# Patient Record
Sex: MALE | Race: WHITE | NOT HISPANIC OR LATINO | Employment: FULL TIME | URBAN - METROPOLITAN AREA
[De-identification: names, ages, dates, MRNs, and addresses within clinical notes are randomized per-mention and may not be internally consistent; named-entity substitution may affect disease eponyms.]

---

## 2021-01-14 ENCOUNTER — LAB VISIT (OUTPATIENT)
Dept: LAB | Facility: HOSPITAL | Age: 46
End: 2021-01-14
Attending: INTERNAL MEDICINE
Payer: COMMERCIAL

## 2021-01-14 ENCOUNTER — OFFICE VISIT (OUTPATIENT)
Dept: PRIMARY CARE CLINIC | Facility: CLINIC | Age: 46
End: 2021-01-14
Payer: COMMERCIAL

## 2021-01-14 ENCOUNTER — TELEPHONE (OUTPATIENT)
Dept: ADMINISTRATIVE | Facility: HOSPITAL | Age: 46
End: 2021-01-14

## 2021-01-14 VITALS
HEART RATE: 76 BPM | DIASTOLIC BLOOD PRESSURE: 67 MMHG | HEIGHT: 68 IN | TEMPERATURE: 98 F | WEIGHT: 179.44 LBS | SYSTOLIC BLOOD PRESSURE: 115 MMHG | RESPIRATION RATE: 18 BRPM | BODY MASS INDEX: 27.19 KG/M2 | OXYGEN SATURATION: 99 %

## 2021-01-14 DIAGNOSIS — Z00.00 NORMAL PHYSICAL EXAM, ROUTINE: ICD-10-CM

## 2021-01-14 DIAGNOSIS — Z00.00 NORMAL PHYSICAL EXAM, ROUTINE: Primary | ICD-10-CM

## 2021-01-14 DIAGNOSIS — L40.9 PSORIASIS: ICD-10-CM

## 2021-01-14 DIAGNOSIS — Z13.220 SCREENING FOR LIPOID DISORDERS: ICD-10-CM

## 2021-01-14 LAB
BASOPHILS # BLD AUTO: 0.03 K/UL (ref 0–0.2)
BASOPHILS NFR BLD: 0.7 % (ref 0–1.9)
DIFFERENTIAL METHOD: ABNORMAL
EOSINOPHIL # BLD AUTO: 0.2 K/UL (ref 0–0.5)
EOSINOPHIL NFR BLD: 3.5 % (ref 0–8)
ERYTHROCYTE [DISTWIDTH] IN BLOOD BY AUTOMATED COUNT: 11.9 % (ref 11.5–14.5)
HCT VFR BLD AUTO: 46.5 % (ref 40–54)
HGB BLD-MCNC: 15.3 G/DL (ref 14–18)
IMM GRANULOCYTES # BLD AUTO: 0.01 K/UL (ref 0–0.04)
IMM GRANULOCYTES NFR BLD AUTO: 0.2 % (ref 0–0.5)
LYMPHOCYTES # BLD AUTO: 1.5 K/UL (ref 1–4.8)
LYMPHOCYTES NFR BLD: 31.9 % (ref 18–48)
MCH RBC QN AUTO: 29.6 PG (ref 27–31)
MCHC RBC AUTO-ENTMCNC: 32.9 G/DL (ref 32–36)
MCV RBC AUTO: 90 FL (ref 82–98)
MONOCYTES # BLD AUTO: 0.7 K/UL (ref 0.3–1)
MONOCYTES NFR BLD: 15.8 % (ref 4–15)
NEUTROPHILS # BLD AUTO: 2.2 K/UL (ref 1.8–7.7)
NEUTROPHILS NFR BLD: 47.9 % (ref 38–73)
NRBC BLD-RTO: 0 /100 WBC
PLATELET # BLD AUTO: 153 K/UL (ref 150–350)
PMV BLD AUTO: 11.5 FL (ref 9.2–12.9)
RBC # BLD AUTO: 5.17 M/UL (ref 4.6–6.2)
WBC # BLD AUTO: 4.61 K/UL (ref 3.9–12.7)

## 2021-01-14 PROCEDURE — 99999 PR PBB SHADOW E&M-NEW PATIENT-LVL III: CPT | Mod: PBBFAC,,, | Performed by: INTERNAL MEDICINE

## 2021-01-14 PROCEDURE — 80061 LIPID PANEL: CPT

## 2021-01-14 PROCEDURE — 1126F AMNT PAIN NOTED NONE PRSNT: CPT | Mod: S$GLB,,, | Performed by: INTERNAL MEDICINE

## 2021-01-14 PROCEDURE — 85025 COMPLETE CBC W/AUTO DIFF WBC: CPT

## 2021-01-14 PROCEDURE — 99386 PR PREVENTIVE VISIT,NEW,40-64: ICD-10-PCS | Mod: S$GLB,,, | Performed by: INTERNAL MEDICINE

## 2021-01-14 PROCEDURE — 1126F PR PAIN SEVERITY QUANTIFIED, NO PAIN PRESENT: ICD-10-PCS | Mod: S$GLB,,, | Performed by: INTERNAL MEDICINE

## 2021-01-14 PROCEDURE — 3008F BODY MASS INDEX DOCD: CPT | Mod: CPTII,S$GLB,, | Performed by: INTERNAL MEDICINE

## 2021-01-14 PROCEDURE — 80053 COMPREHEN METABOLIC PANEL: CPT

## 2021-01-14 PROCEDURE — 84439 ASSAY OF FREE THYROXINE: CPT

## 2021-01-14 PROCEDURE — 99999 PR PBB SHADOW E&M-NEW PATIENT-LVL III: ICD-10-PCS | Mod: PBBFAC,,, | Performed by: INTERNAL MEDICINE

## 2021-01-14 PROCEDURE — 36415 COLL VENOUS BLD VENIPUNCTURE: CPT | Mod: PN

## 2021-01-14 PROCEDURE — 84443 ASSAY THYROID STIM HORMONE: CPT

## 2021-01-14 PROCEDURE — 99386 PREV VISIT NEW AGE 40-64: CPT | Mod: S$GLB,,, | Performed by: INTERNAL MEDICINE

## 2021-01-14 PROCEDURE — 3008F PR BODY MASS INDEX (BMI) DOCUMENTED: ICD-10-PCS | Mod: CPTII,S$GLB,, | Performed by: INTERNAL MEDICINE

## 2021-01-15 LAB
ALBUMIN SERPL BCP-MCNC: 4.5 G/DL (ref 3.5–5.2)
ALP SERPL-CCNC: 79 U/L (ref 55–135)
ALT SERPL W/O P-5'-P-CCNC: 22 U/L (ref 10–44)
ANION GAP SERPL CALC-SCNC: 14 MMOL/L (ref 8–16)
AST SERPL-CCNC: 21 U/L (ref 10–40)
BILIRUB SERPL-MCNC: 1.2 MG/DL (ref 0.1–1)
BUN SERPL-MCNC: 17 MG/DL (ref 6–20)
CALCIUM SERPL-MCNC: 9.2 MG/DL (ref 8.7–10.5)
CHLORIDE SERPL-SCNC: 103 MMOL/L (ref 95–110)
CHOLEST SERPL-MCNC: 164 MG/DL (ref 120–199)
CHOLEST/HDLC SERPL: 4.6 {RATIO} (ref 2–5)
CO2 SERPL-SCNC: 26 MMOL/L (ref 23–29)
CREAT SERPL-MCNC: 0.8 MG/DL (ref 0.5–1.4)
EST. GFR  (AFRICAN AMERICAN): >60 ML/MIN/1.73 M^2
EST. GFR  (NON AFRICAN AMERICAN): >60 ML/MIN/1.73 M^2
GLUCOSE SERPL-MCNC: 76 MG/DL (ref 70–110)
HDLC SERPL-MCNC: 36 MG/DL (ref 40–75)
HDLC SERPL: 22 % (ref 20–50)
LDLC SERPL CALC-MCNC: 106.2 MG/DL (ref 63–159)
NONHDLC SERPL-MCNC: 128 MG/DL
POTASSIUM SERPL-SCNC: 4 MMOL/L (ref 3.5–5.1)
PROT SERPL-MCNC: 6.9 G/DL (ref 6–8.4)
SODIUM SERPL-SCNC: 143 MMOL/L (ref 136–145)
T4 FREE SERPL-MCNC: 1.06 NG/DL (ref 0.71–1.51)
TRIGL SERPL-MCNC: 109 MG/DL (ref 30–150)
TSH SERPL DL<=0.005 MIU/L-ACNC: 0.56 UIU/ML (ref 0.4–4)

## 2021-01-18 DIAGNOSIS — E80.6 HYPERBILIRUBINEMIA: Primary | ICD-10-CM

## 2021-01-19 ENCOUNTER — TELEPHONE (OUTPATIENT)
Dept: PRIMARY CARE CLINIC | Facility: CLINIC | Age: 46
End: 2021-01-19

## 2021-03-04 ENCOUNTER — LAB VISIT (OUTPATIENT)
Dept: LAB | Facility: HOSPITAL | Age: 46
End: 2021-03-04
Attending: INTERNAL MEDICINE
Payer: COMMERCIAL

## 2021-03-04 DIAGNOSIS — E80.6 HYPERBILIRUBINEMIA: ICD-10-CM

## 2021-03-04 LAB
ALBUMIN SERPL BCP-MCNC: 4 G/DL (ref 3.5–5.2)
ALP SERPL-CCNC: 83 U/L (ref 55–135)
ALT SERPL W/O P-5'-P-CCNC: 22 U/L (ref 10–44)
AST SERPL-CCNC: 21 U/L (ref 10–40)
BILIRUB DIRECT SERPL-MCNC: 0.2 MG/DL (ref 0.1–0.3)
BILIRUB SERPL-MCNC: 0.5 MG/DL (ref 0.1–1)
PROT SERPL-MCNC: 6.6 G/DL (ref 6–8.4)

## 2021-03-04 PROCEDURE — 36415 COLL VENOUS BLD VENIPUNCTURE: CPT | Mod: PN | Performed by: INTERNAL MEDICINE

## 2021-03-04 PROCEDURE — 80076 HEPATIC FUNCTION PANEL: CPT | Performed by: INTERNAL MEDICINE

## 2021-04-26 ENCOUNTER — PATIENT MESSAGE (OUTPATIENT)
Dept: RESEARCH | Facility: HOSPITAL | Age: 46
End: 2021-04-26

## 2021-07-29 ENCOUNTER — OFFICE VISIT (OUTPATIENT)
Dept: URGENT CARE | Facility: CLINIC | Age: 46
End: 2021-07-29
Payer: COMMERCIAL

## 2021-07-29 VITALS
TEMPERATURE: 99 F | HEIGHT: 68 IN | HEART RATE: 85 BPM | WEIGHT: 179 LBS | OXYGEN SATURATION: 99 % | DIASTOLIC BLOOD PRESSURE: 81 MMHG | RESPIRATION RATE: 18 BRPM | SYSTOLIC BLOOD PRESSURE: 126 MMHG | BODY MASS INDEX: 27.13 KG/M2

## 2021-07-29 DIAGNOSIS — R05.9 COUGH: Primary | ICD-10-CM

## 2021-07-29 DIAGNOSIS — J40 BRONCHITIS: ICD-10-CM

## 2021-07-29 LAB
CTP QC/QA: YES
SARS-COV-2 RDRP RESP QL NAA+PROBE: NEGATIVE

## 2021-07-29 PROCEDURE — 3079F PR MOST RECENT DIASTOLIC BLOOD PRESSURE 80-89 MM HG: ICD-10-PCS | Mod: CPTII,S$GLB,, | Performed by: NURSE PRACTITIONER

## 2021-07-29 PROCEDURE — 99214 PR OFFICE/OUTPT VISIT, EST, LEVL IV, 30-39 MIN: ICD-10-PCS | Mod: S$GLB,,, | Performed by: NURSE PRACTITIONER

## 2021-07-29 PROCEDURE — U0002 COVID-19 LAB TEST NON-CDC: HCPCS | Mod: QW,S$GLB,, | Performed by: NURSE PRACTITIONER

## 2021-07-29 PROCEDURE — 1160F RVW MEDS BY RX/DR IN RCRD: CPT | Mod: CPTII,S$GLB,, | Performed by: NURSE PRACTITIONER

## 2021-07-29 PROCEDURE — 1159F MED LIST DOCD IN RCRD: CPT | Mod: CPTII,S$GLB,, | Performed by: NURSE PRACTITIONER

## 2021-07-29 PROCEDURE — 1160F PR REVIEW ALL MEDS BY PRESCRIBER/CLIN PHARMACIST DOCUMENTED: ICD-10-PCS | Mod: CPTII,S$GLB,, | Performed by: NURSE PRACTITIONER

## 2021-07-29 PROCEDURE — 3079F DIAST BP 80-89 MM HG: CPT | Mod: CPTII,S$GLB,, | Performed by: NURSE PRACTITIONER

## 2021-07-29 PROCEDURE — 3074F SYST BP LT 130 MM HG: CPT | Mod: CPTII,S$GLB,, | Performed by: NURSE PRACTITIONER

## 2021-07-29 PROCEDURE — U0002: ICD-10-PCS | Mod: QW,S$GLB,, | Performed by: NURSE PRACTITIONER

## 2021-07-29 PROCEDURE — 3008F PR BODY MASS INDEX (BMI) DOCUMENTED: ICD-10-PCS | Mod: CPTII,S$GLB,, | Performed by: NURSE PRACTITIONER

## 2021-07-29 PROCEDURE — 3008F BODY MASS INDEX DOCD: CPT | Mod: CPTII,S$GLB,, | Performed by: NURSE PRACTITIONER

## 2021-07-29 PROCEDURE — 1159F PR MEDICATION LIST DOCUMENTED IN MEDICAL RECORD: ICD-10-PCS | Mod: CPTII,S$GLB,, | Performed by: NURSE PRACTITIONER

## 2021-07-29 PROCEDURE — 99214 OFFICE O/P EST MOD 30 MIN: CPT | Mod: S$GLB,,, | Performed by: NURSE PRACTITIONER

## 2021-07-29 PROCEDURE — 3074F PR MOST RECENT SYSTOLIC BLOOD PRESSURE < 130 MM HG: ICD-10-PCS | Mod: CPTII,S$GLB,, | Performed by: NURSE PRACTITIONER

## 2021-07-29 RX ORDER — ALBUTEROL SULFATE 90 UG/1
2 AEROSOL, METERED RESPIRATORY (INHALATION) EVERY 6 HOURS PRN
Qty: 18 G | Refills: 0 | Status: SHIPPED | OUTPATIENT
Start: 2021-07-29 | End: 2023-05-15 | Stop reason: SDUPTHER

## 2021-07-29 RX ORDER — BENZONATATE 200 MG/1
200 CAPSULE ORAL 3 TIMES DAILY PRN
Qty: 30 CAPSULE | Refills: 0 | Status: SHIPPED | OUTPATIENT
Start: 2021-07-29 | End: 2021-08-08

## 2021-08-02 ENCOUNTER — TELEPHONE (OUTPATIENT)
Dept: UROLOGY | Facility: CLINIC | Age: 46
End: 2021-08-02

## 2021-08-04 ENCOUNTER — OFFICE VISIT (OUTPATIENT)
Dept: UROLOGY | Facility: CLINIC | Age: 46
End: 2021-08-04
Payer: COMMERCIAL

## 2021-08-04 VITALS
WEIGHT: 176.38 LBS | DIASTOLIC BLOOD PRESSURE: 82 MMHG | HEART RATE: 64 BPM | BODY MASS INDEX: 26.73 KG/M2 | SYSTOLIC BLOOD PRESSURE: 129 MMHG | HEIGHT: 68 IN

## 2021-08-04 DIAGNOSIS — N43.40 SPERMATOCELE: Primary | ICD-10-CM

## 2021-08-04 PROCEDURE — 3008F PR BODY MASS INDEX (BMI) DOCUMENTED: ICD-10-PCS | Mod: CPTII,S$GLB,, | Performed by: UROLOGY

## 2021-08-04 PROCEDURE — 3074F SYST BP LT 130 MM HG: CPT | Mod: CPTII,S$GLB,, | Performed by: UROLOGY

## 2021-08-04 PROCEDURE — 3074F PR MOST RECENT SYSTOLIC BLOOD PRESSURE < 130 MM HG: ICD-10-PCS | Mod: CPTII,S$GLB,, | Performed by: UROLOGY

## 2021-08-04 PROCEDURE — 99203 PR OFFICE/OUTPT VISIT, NEW, LEVL III, 30-44 MIN: ICD-10-PCS | Mod: S$GLB,,, | Performed by: UROLOGY

## 2021-08-04 PROCEDURE — 1159F PR MEDICATION LIST DOCUMENTED IN MEDICAL RECORD: ICD-10-PCS | Mod: CPTII,S$GLB,, | Performed by: UROLOGY

## 2021-08-04 PROCEDURE — 99203 OFFICE O/P NEW LOW 30 MIN: CPT | Mod: S$GLB,,, | Performed by: UROLOGY

## 2021-08-04 PROCEDURE — 3079F DIAST BP 80-89 MM HG: CPT | Mod: CPTII,S$GLB,, | Performed by: UROLOGY

## 2021-08-04 PROCEDURE — 1126F AMNT PAIN NOTED NONE PRSNT: CPT | Mod: CPTII,S$GLB,, | Performed by: UROLOGY

## 2021-08-04 PROCEDURE — 1160F PR REVIEW ALL MEDS BY PRESCRIBER/CLIN PHARMACIST DOCUMENTED: ICD-10-PCS | Mod: CPTII,S$GLB,, | Performed by: UROLOGY

## 2021-08-04 PROCEDURE — 3008F BODY MASS INDEX DOCD: CPT | Mod: CPTII,S$GLB,, | Performed by: UROLOGY

## 2021-08-04 PROCEDURE — 3079F PR MOST RECENT DIASTOLIC BLOOD PRESSURE 80-89 MM HG: ICD-10-PCS | Mod: CPTII,S$GLB,, | Performed by: UROLOGY

## 2021-08-04 PROCEDURE — 1160F RVW MEDS BY RX/DR IN RCRD: CPT | Mod: CPTII,S$GLB,, | Performed by: UROLOGY

## 2021-08-04 PROCEDURE — 99999 PR PBB SHADOW E&M-EST. PATIENT-LVL III: ICD-10-PCS | Mod: PBBFAC,,, | Performed by: UROLOGY

## 2021-08-04 PROCEDURE — 99999 PR PBB SHADOW E&M-EST. PATIENT-LVL III: CPT | Mod: PBBFAC,,, | Performed by: UROLOGY

## 2021-08-04 PROCEDURE — 1159F MED LIST DOCD IN RCRD: CPT | Mod: CPTII,S$GLB,, | Performed by: UROLOGY

## 2021-08-04 PROCEDURE — 1126F PR PAIN SEVERITY QUANTIFIED, NO PAIN PRESENT: ICD-10-PCS | Mod: CPTII,S$GLB,, | Performed by: UROLOGY

## 2021-09-13 ENCOUNTER — TELEPHONE (OUTPATIENT)
Dept: PRIMARY CARE CLINIC | Facility: CLINIC | Age: 46
End: 2021-09-13

## 2021-09-13 DIAGNOSIS — U07.1 COVID-19: Primary | ICD-10-CM

## 2021-09-14 ENCOUNTER — PATIENT MESSAGE (OUTPATIENT)
Dept: ADMINISTRATIVE | Facility: OTHER | Age: 46
End: 2021-09-14

## 2021-09-14 ENCOUNTER — INFUSION (OUTPATIENT)
Dept: INFECTIOUS DISEASES | Facility: HOSPITAL | Age: 46
End: 2021-09-14
Attending: INTERNAL MEDICINE
Payer: COMMERCIAL

## 2021-09-14 VITALS
DIASTOLIC BLOOD PRESSURE: 85 MMHG | TEMPERATURE: 99 F | SYSTOLIC BLOOD PRESSURE: 122 MMHG | HEART RATE: 61 BPM | RESPIRATION RATE: 14 BRPM | BODY MASS INDEX: 26.37 KG/M2 | HEIGHT: 68 IN | WEIGHT: 174 LBS | OXYGEN SATURATION: 100 %

## 2021-09-14 DIAGNOSIS — U07.1 COVID-19: ICD-10-CM

## 2021-09-14 PROCEDURE — 63600175 PHARM REV CODE 636 W HCPCS: Performed by: INTERNAL MEDICINE

## 2021-09-14 PROCEDURE — M0243 CASIRIVI AND IMDEVI INFUSION: HCPCS | Performed by: INTERNAL MEDICINE

## 2021-09-14 PROCEDURE — 25000003 PHARM REV CODE 250: Performed by: INTERNAL MEDICINE

## 2021-09-14 RX ORDER — SODIUM CHLORIDE 0.9 % (FLUSH) 0.9 %
10 SYRINGE (ML) INJECTION
Status: DISCONTINUED | OUTPATIENT
Start: 2021-09-14 | End: 2023-05-15

## 2021-09-14 RX ORDER — DIPHENHYDRAMINE HYDROCHLORIDE 50 MG/ML
25 INJECTION INTRAMUSCULAR; INTRAVENOUS ONCE AS NEEDED
Status: DISCONTINUED | OUTPATIENT
Start: 2021-09-14 | End: 2023-05-15

## 2021-09-14 RX ORDER — ACETAMINOPHEN 325 MG/1
650 TABLET ORAL ONCE AS NEEDED
Status: DISCONTINUED | OUTPATIENT
Start: 2021-09-14 | End: 2023-05-15

## 2021-09-14 RX ORDER — ONDANSETRON 4 MG/1
4 TABLET, ORALLY DISINTEGRATING ORAL ONCE AS NEEDED
Status: DISCONTINUED | OUTPATIENT
Start: 2021-09-14 | End: 2023-05-15

## 2021-09-14 RX ORDER — ALBUTEROL SULFATE 90 UG/1
2 AEROSOL, METERED RESPIRATORY (INHALATION)
Status: DISCONTINUED | OUTPATIENT
Start: 2021-09-14 | End: 2023-05-15

## 2021-09-14 RX ORDER — EPINEPHRINE 0.3 MG/.3ML
0.3 INJECTION SUBCUTANEOUS
Status: DISCONTINUED | OUTPATIENT
Start: 2021-09-14 | End: 2023-09-08

## 2021-09-14 RX ADMIN — CASIRIVIMAB AND IMDEVIMAB 600 MG: 600; 600 INJECTION, SOLUTION, CONCENTRATE INTRAVENOUS at 01:09

## 2021-09-15 ENCOUNTER — PATIENT MESSAGE (OUTPATIENT)
Dept: ADMINISTRATIVE | Facility: OTHER | Age: 46
End: 2021-09-15

## 2021-09-16 ENCOUNTER — PATIENT MESSAGE (OUTPATIENT)
Dept: ADMINISTRATIVE | Facility: OTHER | Age: 46
End: 2021-09-16

## 2021-09-16 ENCOUNTER — NURSE TRIAGE (OUTPATIENT)
Dept: ADMINISTRATIVE | Facility: CLINIC | Age: 46
End: 2021-09-16

## 2021-09-17 ENCOUNTER — PATIENT MESSAGE (OUTPATIENT)
Dept: ADMINISTRATIVE | Facility: OTHER | Age: 46
End: 2021-09-17

## 2021-09-18 ENCOUNTER — PATIENT MESSAGE (OUTPATIENT)
Dept: ADMINISTRATIVE | Facility: OTHER | Age: 46
End: 2021-09-18

## 2021-09-19 ENCOUNTER — PATIENT MESSAGE (OUTPATIENT)
Dept: ADMINISTRATIVE | Facility: OTHER | Age: 46
End: 2021-09-19

## 2021-09-20 ENCOUNTER — PATIENT MESSAGE (OUTPATIENT)
Dept: ADMINISTRATIVE | Facility: OTHER | Age: 46
End: 2021-09-20

## 2021-09-21 ENCOUNTER — PATIENT MESSAGE (OUTPATIENT)
Dept: ADMINISTRATIVE | Facility: OTHER | Age: 46
End: 2021-09-21

## 2021-09-22 ENCOUNTER — PATIENT MESSAGE (OUTPATIENT)
Dept: ADMINISTRATIVE | Facility: OTHER | Age: 46
End: 2021-09-22

## 2021-09-23 ENCOUNTER — PATIENT MESSAGE (OUTPATIENT)
Dept: ADMINISTRATIVE | Facility: OTHER | Age: 46
End: 2021-09-23

## 2021-09-25 ENCOUNTER — PATIENT MESSAGE (OUTPATIENT)
Dept: ADMINISTRATIVE | Facility: OTHER | Age: 46
End: 2021-09-25

## 2021-09-26 ENCOUNTER — PATIENT MESSAGE (OUTPATIENT)
Dept: ADMINISTRATIVE | Facility: OTHER | Age: 46
End: 2021-09-26

## 2021-12-13 ENCOUNTER — PATIENT MESSAGE (OUTPATIENT)
Dept: PRIMARY CARE CLINIC | Facility: CLINIC | Age: 46
End: 2021-12-13
Payer: COMMERCIAL

## 2022-01-18 ENCOUNTER — PATIENT MESSAGE (OUTPATIENT)
Dept: ADMINISTRATIVE | Facility: HOSPITAL | Age: 47
End: 2022-01-18
Payer: COMMERCIAL

## 2022-02-07 ENCOUNTER — PATIENT MESSAGE (OUTPATIENT)
Dept: PRIMARY CARE CLINIC | Facility: CLINIC | Age: 47
End: 2022-02-07
Payer: COMMERCIAL
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
## 2022-08-24 ENCOUNTER — HOSPITAL ENCOUNTER (OUTPATIENT)
Dept: RADIOLOGY | Facility: HOSPITAL | Age: 47
Discharge: HOME | End: 2022-08-24
Attending: INTERNAL MEDICINE
Payer: COMMERCIAL

## 2022-08-24 ENCOUNTER — OFFICE VISIT (OUTPATIENT)
Dept: PRIMARY CARE CLINIC | Facility: CLINIC | Age: 47
End: 2022-08-24
Payer: COMMERCIAL

## 2022-08-24 VITALS
SYSTOLIC BLOOD PRESSURE: 114 MMHG | HEIGHT: 68 IN | OXYGEN SATURATION: 97 % | TEMPERATURE: 98 F | RESPIRATION RATE: 16 BRPM | HEART RATE: 59 BPM | BODY MASS INDEX: 27.23 KG/M2 | WEIGHT: 179.69 LBS | DIASTOLIC BLOOD PRESSURE: 82 MMHG

## 2022-08-24 DIAGNOSIS — R05.9 COUGH: ICD-10-CM

## 2022-08-24 DIAGNOSIS — Z12.11 COLON CANCER SCREENING: ICD-10-CM

## 2022-08-24 DIAGNOSIS — Z00.00 NORMAL PHYSICAL EXAM, ROUTINE: Primary | ICD-10-CM

## 2022-08-24 DIAGNOSIS — Z11.59 NEED FOR HEPATITIS C SCREENING TEST: ICD-10-CM

## 2022-08-24 DIAGNOSIS — R00.2 PALPITATIONS: ICD-10-CM

## 2022-08-24 DIAGNOSIS — M79.671 RIGHT FOOT PAIN: ICD-10-CM

## 2022-08-24 DIAGNOSIS — Z13.220 SCREENING FOR LIPOID DISORDERS: ICD-10-CM

## 2022-08-24 PROCEDURE — 1159F PR MEDICATION LIST DOCUMENTED IN MEDICAL RECORD: ICD-10-PCS | Mod: CPTII,S$GLB,, | Performed by: INTERNAL MEDICINE

## 2022-08-24 PROCEDURE — 99396 PR PREVENTIVE VISIT,EST,40-64: ICD-10-PCS | Mod: S$GLB,,, | Performed by: INTERNAL MEDICINE

## 2022-08-24 PROCEDURE — 99396 PREV VISIT EST AGE 40-64: CPT | Mod: S$GLB,,, | Performed by: INTERNAL MEDICINE

## 2022-08-24 PROCEDURE — 3079F DIAST BP 80-89 MM HG: CPT | Mod: CPTII,S$GLB,, | Performed by: INTERNAL MEDICINE

## 2022-08-24 PROCEDURE — 3074F SYST BP LT 130 MM HG: CPT | Mod: CPTII,S$GLB,, | Performed by: INTERNAL MEDICINE

## 2022-08-24 PROCEDURE — 93005 EKG 12-LEAD: ICD-10-PCS | Mod: S$GLB,,, | Performed by: INTERNAL MEDICINE

## 2022-08-24 PROCEDURE — 3008F PR BODY MASS INDEX (BMI) DOCUMENTED: ICD-10-PCS | Mod: CPTII,S$GLB,, | Performed by: INTERNAL MEDICINE

## 2022-08-24 PROCEDURE — 93010 ELECTROCARDIOGRAM REPORT: CPT | Mod: S$GLB,,, | Performed by: INTERNAL MEDICINE

## 2022-08-24 PROCEDURE — 99999 PR PBB SHADOW E&M-EST. PATIENT-LVL V: CPT | Mod: PBBFAC,,, | Performed by: INTERNAL MEDICINE

## 2022-08-24 PROCEDURE — 3074F PR MOST RECENT SYSTOLIC BLOOD PRESSURE < 130 MM HG: ICD-10-PCS | Mod: CPTII,S$GLB,, | Performed by: INTERNAL MEDICINE

## 2022-08-24 PROCEDURE — 71046 X-RAY EXAM CHEST 2 VIEWS: CPT | Mod: TC,PN

## 2022-08-24 PROCEDURE — 71046 XR CHEST PA AND LATERAL: ICD-10-PCS | Mod: 26,,, | Performed by: RADIOLOGY

## 2022-08-24 PROCEDURE — 1159F MED LIST DOCD IN RCRD: CPT | Mod: CPTII,S$GLB,, | Performed by: INTERNAL MEDICINE

## 2022-08-24 PROCEDURE — 93005 ELECTROCARDIOGRAM TRACING: CPT | Mod: S$GLB,,, | Performed by: INTERNAL MEDICINE

## 2022-08-24 PROCEDURE — 3079F PR MOST RECENT DIASTOLIC BLOOD PRESSURE 80-89 MM HG: ICD-10-PCS | Mod: CPTII,S$GLB,, | Performed by: INTERNAL MEDICINE

## 2022-08-24 PROCEDURE — 93010 EKG 12-LEAD: ICD-10-PCS | Mod: S$GLB,,, | Performed by: INTERNAL MEDICINE

## 2022-08-24 PROCEDURE — 71046 X-RAY EXAM CHEST 2 VIEWS: CPT | Mod: 26,,, | Performed by: RADIOLOGY

## 2022-08-24 PROCEDURE — 99999 PR PBB SHADOW E&M-EST. PATIENT-LVL V: ICD-10-PCS | Mod: PBBFAC,,, | Performed by: INTERNAL MEDICINE

## 2022-08-24 PROCEDURE — 3008F BODY MASS INDEX DOCD: CPT | Mod: CPTII,S$GLB,, | Performed by: INTERNAL MEDICINE

## 2022-08-25 ENCOUNTER — TELEPHONE (OUTPATIENT)
Dept: PRIMARY CARE CLINIC | Facility: CLINIC | Age: 47
End: 2022-08-25
Payer: COMMERCIAL

## 2022-08-25 NOTE — TELEPHONE ENCOUNTER
----- Message from Nahomi Bolton sent at 8/25/2022  8:30 AM CDT -----  Patient is requesting external referral to Dr.Field Wallis for Ortho.

## 2022-11-28 PROBLEM — Z00.00 NORMAL PHYSICAL EXAM, ROUTINE: Status: RESOLVED | Noted: 2022-08-24 | Resolved: 2022-11-28

## 2022-11-28 PROBLEM — Z13.220 SCREENING FOR LIPOID DISORDERS: Status: RESOLVED | Noted: 2022-08-24 | Resolved: 2022-11-28

## 2022-12-30 ENCOUNTER — OFFICE VISIT (OUTPATIENT)
Dept: PRIMARY CARE CLINIC | Facility: CLINIC | Age: 47
End: 2022-12-30
Payer: COMMERCIAL

## 2022-12-30 VITALS
WEIGHT: 183 LBS | DIASTOLIC BLOOD PRESSURE: 78 MMHG | SYSTOLIC BLOOD PRESSURE: 110 MMHG | OXYGEN SATURATION: 95 % | TEMPERATURE: 98 F | HEART RATE: 66 BPM | BODY MASS INDEX: 27.74 KG/M2 | HEIGHT: 68 IN

## 2022-12-30 DIAGNOSIS — K21.9 GASTROESOPHAGEAL REFLUX DISEASE, UNSPECIFIED WHETHER ESOPHAGITIS PRESENT: Primary | ICD-10-CM

## 2022-12-30 DIAGNOSIS — R06.6 HICCUPS: ICD-10-CM

## 2022-12-30 PROCEDURE — 99213 OFFICE O/P EST LOW 20 MIN: CPT | Mod: S$GLB,,, | Performed by: INTERNAL MEDICINE

## 2022-12-30 PROCEDURE — 1160F RVW MEDS BY RX/DR IN RCRD: CPT | Mod: CPTII,S$GLB,, | Performed by: INTERNAL MEDICINE

## 2022-12-30 PROCEDURE — 1159F PR MEDICATION LIST DOCUMENTED IN MEDICAL RECORD: ICD-10-PCS | Mod: CPTII,S$GLB,, | Performed by: INTERNAL MEDICINE

## 2022-12-30 PROCEDURE — 1159F MED LIST DOCD IN RCRD: CPT | Mod: CPTII,S$GLB,, | Performed by: INTERNAL MEDICINE

## 2022-12-30 PROCEDURE — 3074F SYST BP LT 130 MM HG: CPT | Mod: CPTII,S$GLB,, | Performed by: INTERNAL MEDICINE

## 2022-12-30 PROCEDURE — 1160F PR REVIEW ALL MEDS BY PRESCRIBER/CLIN PHARMACIST DOCUMENTED: ICD-10-PCS | Mod: CPTII,S$GLB,, | Performed by: INTERNAL MEDICINE

## 2022-12-30 PROCEDURE — 3008F BODY MASS INDEX DOCD: CPT | Mod: CPTII,S$GLB,, | Performed by: INTERNAL MEDICINE

## 2022-12-30 PROCEDURE — 3074F PR MOST RECENT SYSTOLIC BLOOD PRESSURE < 130 MM HG: ICD-10-PCS | Mod: CPTII,S$GLB,, | Performed by: INTERNAL MEDICINE

## 2022-12-30 PROCEDURE — 99999 PR PBB SHADOW E&M-EST. PATIENT-LVL III: ICD-10-PCS | Mod: PBBFAC,,, | Performed by: INTERNAL MEDICINE

## 2022-12-30 PROCEDURE — 99999 PR PBB SHADOW E&M-EST. PATIENT-LVL III: CPT | Mod: PBBFAC,,, | Performed by: INTERNAL MEDICINE

## 2022-12-30 PROCEDURE — 3008F PR BODY MASS INDEX (BMI) DOCUMENTED: ICD-10-PCS | Mod: CPTII,S$GLB,, | Performed by: INTERNAL MEDICINE

## 2022-12-30 PROCEDURE — 3078F PR MOST RECENT DIASTOLIC BLOOD PRESSURE < 80 MM HG: ICD-10-PCS | Mod: CPTII,S$GLB,, | Performed by: INTERNAL MEDICINE

## 2022-12-30 PROCEDURE — 3078F DIAST BP <80 MM HG: CPT | Mod: CPTII,S$GLB,, | Performed by: INTERNAL MEDICINE

## 2022-12-30 PROCEDURE — 99213 PR OFFICE/OUTPT VISIT, EST, LEVL III, 20-29 MIN: ICD-10-PCS | Mod: S$GLB,,, | Performed by: INTERNAL MEDICINE

## 2022-12-30 RX ORDER — CALCIUM CARBONATE 200(500)MG
1 TABLET,CHEWABLE ORAL
COMMUNITY

## 2022-12-30 RX ORDER — HYDROGEN PEROXIDE 3 %
20 SOLUTION, NON-ORAL MISCELLANEOUS
COMMUNITY
End: 2023-05-15

## 2022-12-30 NOTE — PROGRESS NOTES
"Subjective:       Patient ID: Willy Bueno is a 47 y.o. male.    Chief Complaint: Gastroesophageal Reflux    Patient of Dr. Talavera presents for an urgent visit c/o reflux and hiccups. No prior diagnosis of GERD. He reports acute reflux of stomach contents beginning yesterday afternoon upon eating rich party food and drinking 3 alcoholic beverages. He began having hiccups about 2:00 in the afternoon, which persisted into the evening hours. Hiccups greatly aggravated the reflux of stomach contents to the point where he started vomiting. He took several doses of TUMS, then tried a chewable Pepto Bismol which helped. He was able to sleep a few hours, but reflux and hiccups recurred after eating toast this morning. Then he took an OTC Nexium, symptoms are subsiding.     PMH: Not previously evaluated, but reports reflux and bloating have occurred after meals in recent months since he gained weight.   Former tobacco use, Alcohol is typically one a week. Regular diet, caffeine consumption in about 40 ounces of coffee daily. High stress job.  Allergies: Bactrim, Doxy.   No daily meds.        Review of Systems   Constitutional:  Negative for chills, diaphoresis and fever.   HENT:  Positive for sore throat. Negative for trouble swallowing.    Respiratory:  Negative for cough, chest tightness, shortness of breath and wheezing.    Gastrointestinal:  Negative for abdominal pain and blood in stool.        One episode of diarrhea last night.    Musculoskeletal:  Negative for arthralgias and myalgias.   Neurological:  Negative for dizziness and weakness.       Objective:      Vitals:    12/30/22 1100   BP: 110/78   BP Location: Right arm   Patient Position: Sitting   BP Method: Medium (Manual)   Pulse: 66   Temp: 98 °F (36.7 °C)   TempSrc: Oral   SpO2: 95%   Weight: 83 kg (182 lb 15.7 oz)   Height: 5' 8" (1.727 m)   BMI=28  Physical Exam  Constitutional:       General: He is not in acute distress.     Appearance: He is not " ill-appearing.   HENT:      Right Ear: Tympanic membrane and ear canal normal.      Left Ear: Tympanic membrane and ear canal normal.      Nose: Nose normal. No congestion.      Mouth/Throat:      Mouth: Mucous membranes are moist.      Pharynx: Oropharynx is clear. No oropharyngeal exudate or posterior oropharyngeal erythema.   Eyes:      General:         Right eye: No discharge.         Left eye: No discharge.      Conjunctiva/sclera: Conjunctivae normal.   Neck:      Thyroid: No thyromegaly.   Cardiovascular:      Rate and Rhythm: Normal rate and regular rhythm.      Heart sounds: Normal heart sounds.   Pulmonary:      Effort: Pulmonary effort is normal. No respiratory distress.      Breath sounds: Normal breath sounds. No stridor. No wheezing, rhonchi or rales.   Abdominal:      General: Bowel sounds are normal. There is no distension.      Palpations: Abdomen is soft. There is no mass.      Tenderness: There is no abdominal tenderness. There is no guarding or rebound.      Hernia: No hernia is present.   Musculoskeletal:         General: Normal range of motion.      Cervical back: Normal range of motion and neck supple.      Right lower leg: No edema.      Left lower leg: No edema.   Lymphadenopathy:      Cervical: No cervical adenopathy.   Skin:     General: Skin is warm and dry.   Neurological:      Mental Status: He is alert.   Psychiatric:         Mood and Affect: Mood normal.         Behavior: Behavior normal.       Assessment:       Problem List Items Addressed This Visit    None  Visit Diagnoses       Gastroesophageal reflux disease, unspecified whether esophagitis present    -  Primary    Hiccups                  Plan:       Gastroesophageal reflux disease, unspecified whether esophagitis present      -   Nexium 20mg daily for 4-6 weeks, before breakfast. Counseled on diet and reflux precautions, reduce caffeine, avoid alcohol. May still use a fast-acting med like Pepcid for breakthrough symptoms until  the Nexium reaches full effect (not within 2 hours of each other).   Hiccups - acute, not intractable.       -   should respond to treatment for GERD, no further work-up needed at this time.

## 2023-02-10 LAB — CRC RECOMMENDATION EXT: NORMAL

## 2023-02-14 ENCOUNTER — PATIENT OUTREACH (OUTPATIENT)
Dept: ADMINISTRATIVE | Facility: HOSPITAL | Age: 48
End: 2023-02-14
Payer: COMMERCIAL

## 2023-02-14 ENCOUNTER — PATIENT MESSAGE (OUTPATIENT)
Dept: PRIMARY CARE CLINIC | Facility: CLINIC | Age: 48
End: 2023-02-14
Payer: COMMERCIAL

## 2023-02-14 NOTE — TELEPHONE ENCOUNTER
I reviewed the results for egd/c scope and do not see a mention of this. I sw Nataliia at 's office. They also do not have a record of this.

## 2023-02-14 NOTE — TELEPHONE ENCOUNTER
Can we get a copy of Dr. Goodson's note or put in a call because I need a medical necessity/reason for these test otherwise they aren't covered.  I'm happy to put in the orders but need a reason    Dr. HUNT

## 2023-02-14 NOTE — PROGRESS NOTES
Patient due for the following    HIV Screening     Hemoglobin A1c (Diabetic Prevention Screening)       Received EDG/c-scope records.  Scanned to media.  updated    Immunizations: reviewed and updated  Care Everywhere: triggered  Care Teams: up to date  Outreach: none needed

## 2023-02-24 ENCOUNTER — PATIENT OUTREACH (OUTPATIENT)
Dept: ADMINISTRATIVE | Facility: HOSPITAL | Age: 48
End: 2023-02-24
Payer: COMMERCIAL

## 2023-02-24 NOTE — PROGRESS NOTES
Patient due for the following    HIV Screening     Hemoglobin A1c (Diabetic Prevention Screening)       Immunizations: reviewed and updated  Care Everywhere: triggered  Care Teams: up to date  Outreach: none needed

## 2023-05-05 ENCOUNTER — PATIENT MESSAGE (OUTPATIENT)
Dept: UROLOGY | Facility: CLINIC | Age: 48
End: 2023-05-05
Payer: COMMERCIAL

## 2023-05-08 ENCOUNTER — TELEPHONE (OUTPATIENT)
Dept: UROLOGY | Facility: CLINIC | Age: 48
End: 2023-05-08
Payer: COMMERCIAL

## 2023-05-08 ENCOUNTER — OFFICE VISIT (OUTPATIENT)
Dept: UROLOGY | Facility: CLINIC | Age: 48
End: 2023-05-08
Payer: COMMERCIAL

## 2023-05-08 VITALS
HEART RATE: 69 BPM | SYSTOLIC BLOOD PRESSURE: 109 MMHG | HEIGHT: 68 IN | BODY MASS INDEX: 27.74 KG/M2 | WEIGHT: 183 LBS | DIASTOLIC BLOOD PRESSURE: 78 MMHG

## 2023-05-08 DIAGNOSIS — Z30.2 ENCOUNTER FOR MALE STERILIZATION PROCEDURE: Primary | ICD-10-CM

## 2023-05-08 PROBLEM — R05.9 COUGH: Status: RESOLVED | Noted: 2022-08-24 | Resolved: 2023-05-08

## 2023-05-08 PROBLEM — Z11.59 NEED FOR HEPATITIS C SCREENING TEST: Status: RESOLVED | Noted: 2022-08-24 | Resolved: 2023-05-08

## 2023-05-08 PROCEDURE — 1159F MED LIST DOCD IN RCRD: CPT | Mod: CPTII,S$GLB,, | Performed by: UROLOGY

## 2023-05-08 PROCEDURE — 3078F DIAST BP <80 MM HG: CPT | Mod: CPTII,S$GLB,, | Performed by: UROLOGY

## 2023-05-08 PROCEDURE — 99214 PR OFFICE/OUTPT VISIT, EST, LEVL IV, 30-39 MIN: ICD-10-PCS | Mod: S$GLB,,, | Performed by: UROLOGY

## 2023-05-08 PROCEDURE — 3008F BODY MASS INDEX DOCD: CPT | Mod: CPTII,S$GLB,, | Performed by: UROLOGY

## 2023-05-08 PROCEDURE — 1159F PR MEDICATION LIST DOCUMENTED IN MEDICAL RECORD: ICD-10-PCS | Mod: CPTII,S$GLB,, | Performed by: UROLOGY

## 2023-05-08 PROCEDURE — 3008F PR BODY MASS INDEX (BMI) DOCUMENTED: ICD-10-PCS | Mod: CPTII,S$GLB,, | Performed by: UROLOGY

## 2023-05-08 PROCEDURE — 1160F PR REVIEW ALL MEDS BY PRESCRIBER/CLIN PHARMACIST DOCUMENTED: ICD-10-PCS | Mod: CPTII,S$GLB,, | Performed by: UROLOGY

## 2023-05-08 PROCEDURE — 1160F RVW MEDS BY RX/DR IN RCRD: CPT | Mod: CPTII,S$GLB,, | Performed by: UROLOGY

## 2023-05-08 PROCEDURE — 3074F PR MOST RECENT SYSTOLIC BLOOD PRESSURE < 130 MM HG: ICD-10-PCS | Mod: CPTII,S$GLB,, | Performed by: UROLOGY

## 2023-05-08 PROCEDURE — 99999 PR PBB SHADOW E&M-EST. PATIENT-LVL IV: ICD-10-PCS | Mod: PBBFAC,,, | Performed by: UROLOGY

## 2023-05-08 PROCEDURE — 3074F SYST BP LT 130 MM HG: CPT | Mod: CPTII,S$GLB,, | Performed by: UROLOGY

## 2023-05-08 PROCEDURE — 3078F PR MOST RECENT DIASTOLIC BLOOD PRESSURE < 80 MM HG: ICD-10-PCS | Mod: CPTII,S$GLB,, | Performed by: UROLOGY

## 2023-05-08 PROCEDURE — 99999 PR PBB SHADOW E&M-EST. PATIENT-LVL IV: CPT | Mod: PBBFAC,,, | Performed by: UROLOGY

## 2023-05-08 PROCEDURE — 99214 OFFICE O/P EST MOD 30 MIN: CPT | Mod: S$GLB,,, | Performed by: UROLOGY

## 2023-05-08 RX ORDER — LIDOCAINE HYDROCHLORIDE 10 MG/ML
20 INJECTION INFILTRATION; PERINEURAL ONCE
Status: DISCONTINUED | OUTPATIENT
Start: 2023-06-07 | End: 2023-05-15

## 2023-05-08 RX ORDER — DIAZEPAM 5 MG/1
TABLET ORAL
Qty: 2 TABLET | Refills: 0 | Status: SHIPPED | OUTPATIENT
Start: 2023-05-08 | End: 2023-05-15

## 2023-05-08 NOTE — PATIENT INSTRUCTIONS
Having a Vasectomy: Before, During, and After the Procedure  Vasectomy is an outpatient (same day) procedure. It can be done in a doctors office, clinic, or hospital. Your doctor will talk with you about preparing for surgery. He or she will also discuss the possible risks and complications with you. After the procedure, follow all your doctors advice for recovery.  Preparing for Surgery      The cut ends of the vas may be tied, closed with a clip, or sealed by heat (cauterized).    Your doctor will talk with you about getting ready for surgery. You may be asked to do the following:  Sign a consent form. This must be done at least a few days before surgery. It gives your doctor permission to do the procedure. It also states that a vasectomy is not guaranteed to make you sterile.  Dont take aspirin, ibuprofen, or naproxen for 1 week before surgery or 1 week after. These medications can cause bleeding after the procedure. Also, tell your doctor if you take any medications, supplements, or herbal remedies.  Tell your doctor if youve had any prior scrotal surgery.  Arrange for an adult family member or friend to give you a ride home after surgery.  Shower and clean your scrotum the day of surgery. Your doctor may also ask you to shave your scrotum.  Bring an athletic supporter (jockstrap) and a pair of loose fitting pants to the doctors office or hospital.  Eat something with sugar before surgery.  During Surgery  The entire procedure usually lasts less than 30 minutes.  Youll be asked to undress and lie on a table.  To prevent pain during surgery, youll be given an injection of local anesthetic in your scrotum or lower groin.  Once the area is numb, one or two small incisions are made in the scrotum.   The vas deferens are lifted through the incision and cut. The ends of the vas are then sealed off using one of several methods.  If needed, the incision is closed with stitches.  You can rest for a while until  youre ready to go home.  Recovering at Home  For about a week, your scrotum may look bruised and slightly swollen. You may also have a small amount of bloody discharge from the incision. This is normal.  To help make your recovery more comfortable, follow the tips below.  Stay off your feet as much as possible for the first 2 days.   Wear an athletic supporter or snug cotton briefs for support.  Ice the area for 24 hours  Take medications with acetaminophen (such as Tylenol) to relieve any discomfort. Dont use aspirin, ibuprofen, or naproxen.  Avoid heavy lifting, exercise, or intercourse for 7 days.  Remember: You must use another form of birth control until youre completely sterile.  Call your doctor if you notice any of the following after surgery:   Increasing pain or swelling in your scrotum  A large black-and-blue area, or a growing lump  Fever or chills  Increasing redness or drainage of the incision  Trouble urinating    Sex After Vasectomy  Vasectomy doesnt change your sexual function. So when you start having sex again, it should feel the same as before. A vasectomy also shouldnt affect your relationship with your partner. Its important to remember, though, that you wont become sterile right away. It will take time before you can have sex without the need for birth control.  Until youre sterile: After a vasectomy, some active sperm still remain in your semen. It will take time and many ejaculations before the sperm are completely gone. During this period, you must use another birth control method to prevent pregnancy. To make sure no sperm are left in your semen, youll need to have one or more semen exams. You usually collect a semen sample at home and bring it to a lab. The sample is then checked under a microscope. Youre sterile only when these samples show no evidence of sperm. Ask your doctor whether additional follow-up is needed.  After youre sterile: After your doctor tells you youre  sterile, you no longer need to use any form of birth control. Youre free to have sex without the fear of unwanted pregnancy. However, a vasectomy does not protect you from sexually transmitted diseases (STDs). If you have more than one sex partner, be sure to practice safer sex by using condoms.  © 9101-0897 [employer], [address] [place] [address], [place] [address]. All rights reserved. This information is not intended as a substitute for professional medical care. Always follow your healthcare professional's instructions.    Vasectomy: Risks and Complications  A vasectomy is an outpatient procedure. This means youll go home the same day. Its done in a doctors office, clinic, or hospital. Before your procedure, youll be asked to read and sign a consent form. This form states youre aware of the possible risks and complications. It also says that you understand that the procedure, though most often successful, cant promise to make you sterile. Be sure you have all your questions answered before you sign this form. Below is a list of risks and possible complications of the procedure.  Risks and Possible Complications of Vasectomy   Vasectomy is safe. But it does have risks. They include the following:  Bleeding or infection.  Sperm granuloma. This is a small, harmless lump. It may form where the vas deferens is sealed off.  Loss of Testicle  Epididymitis.This is inflammation that may cause scrotal aching. It often goes away without treatment. Anti-inflammatory medications can provide relief.  Reconnection of the vas deferens. This can occur in rare cases. It makes you fertile again. This can result in an unwanted pregnancy.  Sperm antibodies.These are a common response of the body to absorbed sperm. The antibodies can make you sterile. This is true even if you later try to reverse your vasectomy.  Long-term testicular discomfort.This may occur after surgery. But its very rare.    © 7526-2079 [employer],  [address] [place] [address]. All rights reserved. This information is not intended as a substitute for professional medical care. Always follow your healthcare professional's instructions.

## 2023-05-08 NOTE — TELEPHONE ENCOUNTER
Call placed to patient. Name and date of birth verified. Patient informed of scheduled appointment with Dr. Campbell for vasectomy consult noted in epic. Patient informed appointment details are noted in patient portal. Patient verbalized understanding.

## 2023-05-08 NOTE — PROGRESS NOTES
Chief Complaint:   Undesired fertility    HPI:  Mr. Bueno is a 47 y.o.  male who presents for evaluation re vasectomy. He has 1 children, ages 5 yo, and his wife is pregnant.  He is certain that he desires no more. He is aware of other forms of contraception.  He's currently using nothing for contraception. He is aware that vasectomy is to be considered permanent/irreversible.    He denies orchalgia.  No dysuria.  No hematuria.    ROS:  Willy Bueno denies headache, blurred vision, fever, nausea, vomiting, chills, abdominal pain, chest pain, sore throat, bleeding per rectum, cough, SOB, recent loss of consciousness, recent mental status changes, seizures, dizziness, or upper or lower extremity weakness.    Past Medical History    Past Medical History:   Diagnosis Date    Dislocated shoulder, left, sequela     x 2     Dislocated shoulder, right, sequela     Jaw fracture     Leg fracture     in childhood     Psoriasis     Supraspinatus tendon tear     left    Wrist fracture, closed        Past Surgical History    Past Surgical History:   Procedure Laterality Date    LIPOMA RESECTION         Social History    Social History     Socioeconomic History    Marital status:    Tobacco Use    Smoking status: Former     Packs/day: 1.00     Years: 22.00     Pack years: 22.00     Types: Cigarettes     Quit date:      Years since quittin.3    Smokeless tobacco: Never   Substance and Sexual Activity    Alcohol use: Yes     Alcohol/week: 1.0 standard drink     Types: 1 Cans of beer per week     Comment: rare    Drug use: Never    Sexual activity: Yes     Partners: Female     Birth control/protection: None       Family History  Family History   Adopted: Yes   Problem Relation Age of Onset    Congenital heart disease Father     Alzheimer's disease Maternal Grandmother     Alzheimer's disease Maternal Aunt 53         Medications    Current Outpatient Medications:     albuterol (PROVENTIL/VENTOLIN HFA) 90  mcg/actuation inhaler, Inhale 2 puffs into the lungs every 6 (six) hours as needed for Wheezing or Shortness of Breath. Rescue, Disp: 18 g, Rfl: 0    calcium carbonate (TUMS) 200 mg calcium (500 mg) chewable tablet, Take 1 tablet by mouth once daily., Disp: , Rfl:     esomeprazole (NEXIUM) 20 MG capsule, Take 20 mg by mouth before breakfast., Disp: , Rfl:     Current Facility-Administered Medications:     acetaminophen tablet 650 mg, 650 mg, Oral, Once PRN, Austen Colón MD    albuterol inhaler 2 puff, 2 puff, Inhalation, Q20 Min PRN, Austen Colón MD    diphenhydrAMINE injection 25 mg, 25 mg, Intravenous, Once PRN, Austen Colón MD    EPINEPHrine (EPIPEN) 0.3 mg/0.3 mL pen injection 0.3 mg, 0.3 mg, Intramuscular, PRN, Austen Colón MD    methylPREDNISolone sodium succinate injection 40 mg, 40 mg, Intravenous, Once PRN, Austen Colón MD    ondansetron disintegrating tablet 4 mg, 4 mg, Oral, Once PRN, Austen Colón MD    sodium chloride 0.9% 500 mL flush bag, , Intravenous, PRN, Austen Colón MD    sodium chloride 0.9% flush 10 mL, 10 mL, Intravenous, PRN, Austen Colón MD    Allergies  Review of patient's allergies indicates:   Allergen Reactions    Bactrim [sulfamethoxazole-trimethoprim] Blisters    Doxycycline Nausea Only         ?  PHYSICAL EXAM:    The patient generally appears in good health, is appropriately interactive, and is in no apparent distress.     Eyes: anicteric sclerae, moist conjunctivae; no lid-lag; PERRLA     HENT: Atraumatic; oropharynx clear with moist mucous membranes and no mucosal ulcerations;normal hard and soft palate.  No evidence of lymphadenopathy.    Neck: Trachea midline.  No thyromegaly.    Skin: No lesions.    Mental: Cooperative with normal affect.  Is oriented to time, place, and person.    Neuro: Grossly intact.    Chest: Normal inspiratory effort.   No accessory muscles.  No audible wheezes.  Respirations symmetric on inspiration and  expiration.    Heart: Regular rhythm.      Abdomen:  Soft, non-tender. No masses or organomegaly. Bladder is not palpable. No evidence of flank discomfort. No evidence of inguinal hernia.    Genitourinary: The penis has no evidence of plaques or induration. The urethral meatus is normal. The testes, epididymides, and cord structures are normal in size and contour bilaterally. The scrotum is normal in size and contour.    Extremities: No clubbing, cyanosis, or edema          A/P:  Willy Bueno is a 47 y.o. male who presents for evaluation re vasectomy.    1.I discussed with the patient risks and benefits, as well as alternatives. We discussed possible complications at length, including fever, infection, bleeding--possibly requiring reoperation,testicular injury or atrophy with loss of function, chronic pain, persistent or recurrent presence of sperm in the ejaculate, vasal recanalization, as well as the risks attendant to the anesthetic.  2.We discussed that he should stop aspirin, ibuprofen, and similar products at least one week prior to the procedure. Acetominophen is okay to use for headaches, pain, etc.  3. He should bring an athletic supporter and loose fitting sweat pants on the day of the procedure.  4. We discussed that he must continue to use contraception until two consecutive semen analyses (checked at approximately 4-6 weeks post-vasectomy) reveal azoospermia.  5. He will schedule an elective vasectomy.

## 2023-05-14 NOTE — PROGRESS NOTES
"[facility] Note    Chief Complaint      Chief Complaint   Patient presents with    Follow-up       History of Present Illness      Willy Bueno is a 47 y.o. WM with Psoriasis, Hypertriglyceridemia, Nicotine dependence in remission presents to fu chronic issues. Referred by wife.  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  Last visit - 8/24/22    Thrombocytopenia-  platelets were just below normal at 141. This is not a concerning level.    Vit B12 def - B12 386 - 5/2/23. Try Vit B12 1000 mcg/d.    Vit D def - Vit D 24. Rec Vit D3 1000 u/d.      Low HDL - Total Cholesterol and bad cholesterol (LDL)  looked good but the good cholesterol (HDL) was low.  Exercise can help to increase this level.  He does not require medication for this.   Hypertriglyceridemia.  It has been as high as 400 in past.      Palpitations - Resolved.      ADD - d/x ADD in 1997 or 1998. He was on medication in college but stopped taking it around 2003. Rec formal testing so can Rx pharmacotherapy if needed in future.      Psoriasis - Pt on topicals per Derm, Dr. Nathaniel Prado    GERD - EGD - Grade I esophagitis, Gastritis, Duodenitis. "I eat with boredom, and eat things that are not necessarily good". He is unhappy with his wt. We discussed diet and exercise. Rec reflux prec.    Colon Polyps - repeat 2/10/23 -  C-scope 3 yrs.     Dyspnea - Note in Mar - he was tx with Plaquenil for suspected COVID -19 despite neg . He report since then even with a mild URI he gets more SOB and his O2 level goes lower 5-10 min.  Worse with lying in 80's and with exertion 91-93%. +h/o Exercise induced asthma in teens. He reports worsening of Sx's after recently cutting the grass last wk. ?allergic triggers. Will refill albuterol to try again and add singulair to try. Warned about potential Behavioral S.E. He will alert me for any concerns.      H/o COVID -19 - 9/2021 s/p monoclonal Ab infusion - 914/21. He also had another bout 7/25/22 - he had fever, " chills, rhinorrhea, fatigue.      Fam h/o Bicuspid aortic valve - Check echo.       HCM - Flu - 10/17/22;  Tdap - 10/17/18; Shingrix - due at 52 y.o.;  COVID -19 -Vaccine 1/12/21; # 2 2/3/21; # 3 12/27/21; #4 12/27/22;   Hep C Screen - 8/4/22 - neg.; HIV Screen - utd - neg.; C-scope - polyps - repeat 3 yrs;  PSA - ; Prev PCP - Dr. Elaine; KEN - Dr. Campbell; Ortho - Dr. Suárez; Derm - Dr. Prado; Ophtho -plans to see Dr. Jones; GI - Dr. Flowers/Dr. Goodson; KEN - Dr. Campbell; Well visit - 8/24/22    Patient Care Team:  Lizzie Talavera MD as PCP - General (Internal Medicine)  Lisette Altamirano MA as Care Coordinator     Health Maintenance:  Immunization History   Administered Date(s) Administered    COVID-19, MRNA, LN-S, PF (MODERNA FULL 0.5 ML DOSE) 12/27/2021    COVID-19, MRNA, LN-S, PF (Pfizer) (Purple Cap) 01/12/2021, 02/03/2021, 12/27/2021    COVID-19, mRNA, LNP-S, bivalent booster, PF (PFIZER OMICRON) 12/27/2022    Influenza (FLUBLOK) - Quadrivalent - Recombinant - PF *Preferred* (egg allergy) 10/01/2020    Influenza - Quadrivalent 10/10/2019    Influenza - Quadrivalent - PF *Preferred* (6 months and older) 02/11/2019, 10/10/2019, 10/08/2021, 10/17/2022    Tdap 10/17/2018      Health Maintenance   Topic Date Due    Lipid Panel  08/24/2027    TETANUS VACCINE  10/17/2028    Hepatitis C Screening  Completed        Past Medical History:  Past Medical History:   Diagnosis Date    Dislocated shoulder, left, sequela     x 2     Dislocated shoulder, right, sequela     Jaw fracture     Leg fracture     in childhood     Psoriasis     Supraspinatus tendon tear     left    Wrist fracture, closed        Past Surgical History:   has a past surgical history that includes Lipoma resection (1996) and Mole removal.    Family History:  family history includes Alzheimer's disease in his maternal grandmother; Alzheimer's disease (age of onset: 53) in his maternal aunt; Congenital heart disease in his father. He was  adopted.     Social History:  Social History     Tobacco Use    Smoking status: Former     Packs/day: 1.00     Years: 22.00     Pack years: 22.00     Types: Cigarettes     Quit date: 2018     Years since quittin.3    Smokeless tobacco: Never   Substance Use Topics    Alcohol use: Yes     Alcohol/week: 1.0 standard drink     Types: 1 Cans of beer per week     Comment: rare    Drug use: Never       Review of Systems   Constitutional:  Negative for chills and fever.   HENT:  Positive for postnasal drip. Negative for nasal congestion and sore throat.         Geographic tongue - no pain or change in taste.   Respiratory:  Positive for shortness of breath. Negative for cough and wheezing.    Cardiovascular:  Negative for chest pain, palpitations and leg swelling.   Gastrointestinal:  Positive for nausea. Negative for abdominal distention, constipation, diarrhea and vomiting.   Musculoskeletal:  Negative for arthralgias and myalgias.   Neurological:  Negative for dizziness and headaches.   Psychiatric/Behavioral:  Negative for dysphoric mood. The patient is not nervous/anxious.       Medications:    Current Outpatient Medications:     famotidine (PEPCID) 20 MG tablet, Take 20 mg by mouth daily as needed for Heartburn., Disp: , Rfl:     albuterol (PROVENTIL/VENTOLIN HFA) 90 mcg/actuation inhaler, Inhale 2 puffs into the lungs every 6 (six) hours as needed for Wheezing or Shortness of Breath. Rescue, Disp: 18 g, Rfl: 0    calcium carbonate (TUMS) 200 mg calcium (500 mg) chewable tablet, Take 1 tablet by mouth once daily., Disp: , Rfl:     montelukast (SINGULAIR) 10 mg tablet, Take 1 tablet (10 mg total) by mouth every evening., Disp: 30 tablet, Rfl: 1    triamcinolone acetonide 0.1% (KENALOG) 0.1 % ointment, Apply topically. Uses prn. Unsure of dosage, Disp: , Rfl:     Current Facility-Administered Medications:     EPINEPHrine (EPIPEN) 0.3 mg/0.3 mL pen injection 0.3 mg, 0.3 mg, Intramuscular, PRN, Austen Colón MD  "    Allergies:  Review of patient's allergies indicates:   Allergen Reactions    Bactrim [sulfamethoxazole-trimethoprim] Blisters    Doxycycline Nausea Only       Physical Exam      Vital Signs  Temp: 98.1 °F (36.7 °C)  Temp Source: Oral  Pulse: 69  Resp: 16  SpO2: 97 %  BP: 107/70  BP Location: Left arm  Patient Position: Sitting  Pain Score: 0-No pain  Height and Weight  Height: 5' 8" (172.7 cm)  Weight: 84 kg (185 lb 3 oz)  BSA (Calculated - sq m): 2.01 sq meters  BMI (Calculated): 28.2  Weight in (lb) to have BMI = 25: 164.1      Patient Position: Sitting      Physical Exam  Vitals reviewed.   Constitutional:       General: He is not in acute distress.     Appearance: Normal appearance. He is not ill-appearing, toxic-appearing or diaphoretic.   HENT:      Head: Normocephalic and atraumatic.      Right Ear: Tympanic membrane normal.      Left Ear: Tympanic membrane normal.   Eyes:      Extraocular Movements: Extraocular movements intact.      Conjunctiva/sclera: Conjunctivae normal.      Pupils: Pupils are equal, round, and reactive to light.   Neck:      Vascular: No carotid bruit.   Cardiovascular:      Rate and Rhythm: Normal rate and regular rhythm.      Pulses: Normal pulses.      Heart sounds: Normal heart sounds. No murmur heard.  Pulmonary:      Effort: No respiratory distress.      Breath sounds: Normal breath sounds. No wheezing.   Abdominal:      General: Bowel sounds are normal. There is no distension.      Palpations: Abdomen is soft.      Tenderness: There is no abdominal tenderness. There is no guarding or rebound.   Neurological:      General: No focal deficit present.      Mental Status: He is alert and oriented to person, place, and time.   Psychiatric:         Mood and Affect: Mood normal.         Behavior: Behavior normal.        Laboratory:  CBC:  Recent Labs   Lab 01/14/21  0930 08/24/22  0843   WBC 4.61 6.54   RBC 5.17 5.19   Hemoglobin 15.3 15.3   Hematocrit 46.5 45.4   Platelets 153 141 L "   MCV 90 88   MCH 29.6 29.5   MCHC 32.9 33.7       CMP:  Recent Labs   Lab 01/14/21  0930 03/04/21  0800 08/24/22  0843   Glucose 76  --  94   Calcium 9.2  --  9.1   Albumin 4.5 4.0 4.1   Total Protein 6.9 6.6 6.5   Sodium 143  --  141   Potassium 4.0  --  4.0   CO2 26  --  27   Chloride 103  --  107   BUN 17  --  13   Creatinine 0.8  --  0.8   Alkaline Phosphatase 79 83 77   ALT 22 22 22   AST 21 21 15   Total Bilirubin 1.2 H 0.5 0.9       LIPIDS:  Recent Labs   Lab 01/14/21  0930 08/24/22  0843   TSH 0.558 0.839   HDL 36 L 35 L   Cholesterol 164 169   Triglycerides 109 174 H   LDL Cholesterol 106.2 99.2   HDL/Cholesterol Ratio 22.0 20.7   Non-HDL Cholesterol 128 134   Total Cholesterol/HDL Ratio 4.6 4.8       TSH:  Recent Labs   Lab 01/14/21  0930 08/24/22  0843   TSH 0.558 0.839         Recent Labs   Lab 08/24/22  0843   Hepatitis C Ab Negative       Assessment/Plan     Willy Bueno is a 47 y.o.male with:    Dyspnea, unspecified type  -     Echo; Future  -     CTA Chest Non-Coronary (PE Studies); Future; Expected date: 05/15/2023  - Check Echo given c/o SOB, hypoxia, and fam h/o Bicuspid aortic valve.  Check CTA chest. DDX also  incl COPD/Asthma    Acute rhinitis  -     montelukast (SINGULAIR) 10 mg tablet; Take 1 tablet (10 mg total) by mouth every evening.  Dispense: 30 tablet; Refill: 1  - Will refill his Albuterol and add singulair to regimen. Pt with h/o Exercise - induced asthma. SOB seems to have started after prev. COVID infection.  +h/o Nicotine dep - quit in 2018.  Consider PFT's if initial lara neg.     Vitamin B12 deficiency  - Rec Vit B12 suppl as above.     Vitamin D insufficiency  - Rec Vit D suppl as above.     Polyp of colon, unspecified part of colon, unspecified type  - Repeat C-scope 32/2026.       Chronic conditions status updated as per HPI.  Other than changes above, cont current medications and maintain follow up with specialists.  Follow up in about 3 months (around 8/25/2023) for  well visit or sooner if needed.      Lizzie Talavera MD  [facility]

## 2023-05-15 ENCOUNTER — OFFICE VISIT (OUTPATIENT)
Dept: PRIMARY CARE CLINIC | Facility: CLINIC | Age: 48
End: 2023-05-15
Payer: COMMERCIAL

## 2023-05-15 VITALS
TEMPERATURE: 98 F | HEART RATE: 69 BPM | DIASTOLIC BLOOD PRESSURE: 70 MMHG | HEIGHT: 68 IN | WEIGHT: 185.19 LBS | SYSTOLIC BLOOD PRESSURE: 107 MMHG | RESPIRATION RATE: 16 BRPM | BODY MASS INDEX: 28.07 KG/M2 | OXYGEN SATURATION: 97 %

## 2023-05-15 DIAGNOSIS — R06.00 DYSPNEA, UNSPECIFIED TYPE: Primary | ICD-10-CM

## 2023-05-15 DIAGNOSIS — K63.5 POLYP OF COLON, UNSPECIFIED PART OF COLON, UNSPECIFIED TYPE: ICD-10-CM

## 2023-05-15 DIAGNOSIS — E55.9 VITAMIN D INSUFFICIENCY: ICD-10-CM

## 2023-05-15 DIAGNOSIS — E53.8 VITAMIN B12 DEFICIENCY: ICD-10-CM

## 2023-05-15 DIAGNOSIS — J00 ACUTE RHINITIS: ICD-10-CM

## 2023-05-15 PROBLEM — R07.9 ACUTE CHEST PAIN: Status: ACTIVE | Noted: 2023-05-15

## 2023-05-15 PROBLEM — J40 BRONCHITIS: Status: ACTIVE | Noted: 2023-05-15

## 2023-05-15 PROCEDURE — 3078F PR MOST RECENT DIASTOLIC BLOOD PRESSURE < 80 MM HG: ICD-10-PCS | Mod: CPTII,S$GLB,, | Performed by: INTERNAL MEDICINE

## 2023-05-15 PROCEDURE — 99999 PR PBB SHADOW E&M-EST. PATIENT-LVL V: CPT | Mod: PBBFAC,,, | Performed by: INTERNAL MEDICINE

## 2023-05-15 PROCEDURE — 99214 OFFICE O/P EST MOD 30 MIN: CPT | Mod: S$GLB,,, | Performed by: INTERNAL MEDICINE

## 2023-05-15 PROCEDURE — 1159F MED LIST DOCD IN RCRD: CPT | Mod: CPTII,S$GLB,, | Performed by: INTERNAL MEDICINE

## 2023-05-15 PROCEDURE — 3074F PR MOST RECENT SYSTOLIC BLOOD PRESSURE < 130 MM HG: ICD-10-PCS | Mod: CPTII,S$GLB,, | Performed by: INTERNAL MEDICINE

## 2023-05-15 PROCEDURE — 99214 PR OFFICE/OUTPT VISIT, EST, LEVL IV, 30-39 MIN: ICD-10-PCS | Mod: S$GLB,,, | Performed by: INTERNAL MEDICINE

## 2023-05-15 PROCEDURE — 3008F PR BODY MASS INDEX (BMI) DOCUMENTED: ICD-10-PCS | Mod: CPTII,S$GLB,, | Performed by: INTERNAL MEDICINE

## 2023-05-15 PROCEDURE — 1159F PR MEDICATION LIST DOCUMENTED IN MEDICAL RECORD: ICD-10-PCS | Mod: CPTII,S$GLB,, | Performed by: INTERNAL MEDICINE

## 2023-05-15 PROCEDURE — 1160F RVW MEDS BY RX/DR IN RCRD: CPT | Mod: CPTII,S$GLB,, | Performed by: INTERNAL MEDICINE

## 2023-05-15 PROCEDURE — 1160F PR REVIEW ALL MEDS BY PRESCRIBER/CLIN PHARMACIST DOCUMENTED: ICD-10-PCS | Mod: CPTII,S$GLB,, | Performed by: INTERNAL MEDICINE

## 2023-05-15 PROCEDURE — 99999 PR PBB SHADOW E&M-EST. PATIENT-LVL V: ICD-10-PCS | Mod: PBBFAC,,, | Performed by: INTERNAL MEDICINE

## 2023-05-15 PROCEDURE — 3078F DIAST BP <80 MM HG: CPT | Mod: CPTII,S$GLB,, | Performed by: INTERNAL MEDICINE

## 2023-05-15 PROCEDURE — 3074F SYST BP LT 130 MM HG: CPT | Mod: CPTII,S$GLB,, | Performed by: INTERNAL MEDICINE

## 2023-05-15 PROCEDURE — 3008F BODY MASS INDEX DOCD: CPT | Mod: CPTII,S$GLB,, | Performed by: INTERNAL MEDICINE

## 2023-05-15 RX ORDER — ALBUTEROL SULFATE 90 UG/1
2 AEROSOL, METERED RESPIRATORY (INHALATION) EVERY 6 HOURS PRN
Qty: 18 G | Refills: 0 | Status: SHIPPED | OUTPATIENT
Start: 2023-05-15 | End: 2023-09-08 | Stop reason: SDUPTHER

## 2023-05-15 RX ORDER — TRIAMCINOLONE ACETONIDE 1 MG/G
OINTMENT TOPICAL
COMMUNITY

## 2023-05-15 RX ORDER — FAMOTIDINE 20 MG/1
20 TABLET, FILM COATED ORAL DAILY PRN
COMMUNITY

## 2023-05-15 RX ORDER — MONTELUKAST SODIUM 10 MG/1
10 TABLET ORAL NIGHTLY
Qty: 30 TABLET | Refills: 1 | Status: SHIPPED | OUTPATIENT
Start: 2023-05-15 | End: 2023-06-26 | Stop reason: SDUPTHER

## 2023-05-17 ENCOUNTER — PATIENT MESSAGE (OUTPATIENT)
Dept: UROLOGY | Facility: CLINIC | Age: 48
End: 2023-05-17
Payer: COMMERCIAL

## 2023-05-18 ENCOUNTER — HOSPITAL ENCOUNTER (OUTPATIENT)
Dept: RADIOLOGY | Facility: HOSPITAL | Age: 48
Discharge: HOME | End: 2023-05-18
Attending: INTERNAL MEDICINE
Payer: COMMERCIAL

## 2023-05-18 ENCOUNTER — PATIENT MESSAGE (OUTPATIENT)
Dept: PRIMARY CARE CLINIC | Facility: CLINIC | Age: 48
End: 2023-05-18
Payer: COMMERCIAL

## 2023-05-18 DIAGNOSIS — R09.89 LUNG CRACKLES: Primary | ICD-10-CM

## 2023-05-18 DIAGNOSIS — R09.89 LUNG CRACKLES: ICD-10-CM

## 2023-05-18 PROCEDURE — 71046 X-RAY EXAM CHEST 2 VIEWS: CPT | Mod: 26,,, | Performed by: RADIOLOGY

## 2023-05-18 PROCEDURE — 71046 XR CHEST PA AND LATERAL: ICD-10-PCS | Mod: 26,,, | Performed by: RADIOLOGY

## 2023-05-18 PROCEDURE — 71046 X-RAY EXAM CHEST 2 VIEWS: CPT | Mod: TC

## 2023-05-18 RX ORDER — LEVOFLOXACIN 750 MG/1
750 TABLET ORAL DAILY
Qty: 7 TABLET | Refills: 0 | Status: SHIPPED | OUTPATIENT
Start: 2023-05-18 | End: 2023-05-23

## 2023-05-18 NOTE — TELEPHONE ENCOUNTER
Spoke with the pt, he denies SOB or gasping for air. He noted that the coughing fits typically start if he takes  a deep breath. He reports that the crackling was only heard last night and has not reoccurred today during the day. Reports continued frequent coughing today, but less productive. Reports feeling hoarse. SpO2 recorded with his watch last night while sleeping was 95%. During the day while up SpO2 has been 96% to 97%.    Cough worse with position change when lying down last night. Please advise.

## 2023-05-18 NOTE — TELEPHONE ENCOUNTER
Let's get a CXR while we await his CTA chest. Will send in Abx. Spoke with  pt. He has not tried inhaler yest but will do so this evening.  I sent him a Rx for Levaquin.  If worsens may need to go to ED.    Dr. HUNT

## 2023-05-19 ENCOUNTER — PATIENT MESSAGE (OUTPATIENT)
Dept: PRIMARY CARE CLINIC | Facility: CLINIC | Age: 48
End: 2023-05-19
Payer: COMMERCIAL

## 2023-05-19 ENCOUNTER — HOSPITAL ENCOUNTER (OUTPATIENT)
Dept: CARDIOLOGY | Facility: HOSPITAL | Age: 48
Discharge: HOME | End: 2023-05-19
Attending: INTERNAL MEDICINE
Payer: COMMERCIAL

## 2023-05-19 VITALS — HEIGHT: 68 IN | BODY MASS INDEX: 28.04 KG/M2 | WEIGHT: 185 LBS

## 2023-05-19 DIAGNOSIS — R06.00 DYSPNEA, UNSPECIFIED TYPE: ICD-10-CM

## 2023-05-19 LAB
AV INDEX (PROSTH): 0.89
AV MEAN GRADIENT: 3 MMHG
AV PEAK GRADIENT: 6 MMHG
AV VALVE AREA: 3.16 CM2
AV VELOCITY RATIO: 0.89
BSA FOR ECHO PROCEDURE: 2.01 M2
CV ECHO LV RWT: 0.37 CM
DOP CALC AO PEAK VEL: 1.26 M/S
DOP CALC AO VTI: 23.8 CM
DOP CALC LVOT AREA: 3.5 CM2
DOP CALC LVOT DIAMETER: 2.12 CM
DOP CALC LVOT PEAK VEL: 1.12 M/S
DOP CALC LVOT STROKE VOLUME: 75.15 CM3
DOP CALC MV VTI: 25.5 CM
DOP CALCLVOT PEAK VEL VTI: 21.3 CM
E WAVE DECELERATION TIME: 205.79 MSEC
E/A RATIO: 0.99
E/E' RATIO: 5.69 M/S
ECHO LV POSTERIOR WALL: 0.79 CM (ref 0.6–1.1)
EJECTION FRACTION: 65 %
FRACTIONAL SHORTENING: 40 % (ref 28–44)
INTERVENTRICULAR SEPTUM: 0.98 CM (ref 0.6–1.1)
IVC DIAMETER: 2.11 CM
LA MAJOR: 5.13 CM
LA MINOR: 5.45 CM
LA WIDTH: 3.5 CM
LEFT ATRIUM SIZE: 3.53 CM
LEFT ATRIUM VOLUME INDEX MOD: 17.7 ML/M2
LEFT ATRIUM VOLUME INDEX: 28 ML/M2
LEFT ATRIUM VOLUME MOD: 35.03 CM3
LEFT ATRIUM VOLUME: 55.5 CM3
LEFT INTERNAL DIMENSION IN SYSTOLE: 2.59 CM (ref 2.1–4)
LEFT VENTRICLE DIASTOLIC VOLUME INDEX: 41.9 ML/M2
LEFT VENTRICLE DIASTOLIC VOLUME: 82.96 ML
LEFT VENTRICLE MASS INDEX: 61 G/M2
LEFT VENTRICLE SYSTOLIC VOLUME INDEX: 12.4 ML/M2
LEFT VENTRICLE SYSTOLIC VOLUME: 24.46 ML
LEFT VENTRICULAR INTERNAL DIMENSION IN DIASTOLE: 4.3 CM (ref 3.5–6)
LEFT VENTRICULAR MASS: 120.53 G
LV LATERAL E/E' RATIO: 4.63 M/S
LV SEPTAL E/E' RATIO: 7.4 M/S
LVOT MG: 2.46 MMHG
LVOT MV: 0.73 CM/S
MV MEAN GRADIENT: 1 MMHG
MV PEAK A VEL: 0.75 M/S
MV PEAK E VEL: 0.74 M/S
MV PEAK GRADIENT: 3 MMHG
MV STENOSIS PRESSURE HALF TIME: 59.68 MS
MV VALVE AREA BY CONTINUITY EQUATION: 2.95 CM2
MV VALVE AREA P 1/2 METHOD: 3.69 CM2
OHS LV EJECTION FRACTION SIMPSONS BIPLANE MOD: 7 %
PISA TR MAX VEL: 2.34 M/S
RA MAJOR: 4.92 CM
RA PRESSURE: 8 MMHG
RIGHT VENTRICULAR END-DIASTOLIC DIMENSION: 3.16 CM
SINUS: 4 CM
TDI LATERAL: 0.16 M/S
TDI SEPTAL: 0.1 M/S
TDI: 0.13 M/S
TR MAX PG: 22 MMHG
TV REST PULMONARY ARTERY PRESSURE: 30 MMHG

## 2023-05-19 PROCEDURE — 93306 TTE W/DOPPLER COMPLETE: CPT

## 2023-05-19 PROCEDURE — 93306 TTE W/DOPPLER COMPLETE: CPT | Mod: 26,,, | Performed by: INTERNAL MEDICINE

## 2023-05-19 PROCEDURE — 93306 ECHO (CUPID ONLY): ICD-10-PCS | Mod: 26,,, | Performed by: INTERNAL MEDICINE

## 2023-05-19 NOTE — PROGRESS NOTES
I d/w pt and Dr. Hicks-  I reviewed your Echo which was pretty good. It showed a Normal ejection fraction -65%.  So no signs of heart failure. You had some Trace MR, mild Tricuspid regurgitation which is not uncommon and I do not suspect is causing your complaints of Shortness of breath. There appears to be lipomatous hypertrophy of the interatrial septum. There appears to be lipomatous hypertrophy of the interatrial septum. He is sched for CTA which will give more info about whether there is an aneurysm.  I d/w pt and his biologic father had a bicuspid valve and an aneurysm. He is feeling better with the Abx and the inhaler.   Dr. HUNT

## 2023-05-22 ENCOUNTER — PATIENT MESSAGE (OUTPATIENT)
Dept: PRIMARY CARE CLINIC | Facility: CLINIC | Age: 48
End: 2023-05-22
Payer: COMMERCIAL

## 2023-05-22 NOTE — TELEPHONE ENCOUNTER
Lov 5/15/23  The Levoquin rx was written for #7 but sig shows to take once daily for 5 days. Do you want him to take for 5 or 7 days?

## 2023-05-22 NOTE — TELEPHONE ENCOUNTER
I d/w pt and apologized as it was meant to be 5 as I had typed in the sig. He is feelign sig better and his CXR was normal.  He is sched for a CT chest in Am and has an appt with Dr. Hooper in a few days    Dr HUNT

## 2023-05-23 ENCOUNTER — HOSPITAL ENCOUNTER (OUTPATIENT)
Dept: RADIOLOGY | Facility: HOSPITAL | Age: 48
Discharge: HOME | End: 2023-05-23
Attending: INTERNAL MEDICINE
Payer: COMMERCIAL

## 2023-05-23 DIAGNOSIS — R06.00 DYSPNEA, UNSPECIFIED TYPE: ICD-10-CM

## 2023-05-23 DIAGNOSIS — E01.0 THYROMEGALY: Primary | ICD-10-CM

## 2023-05-23 PROCEDURE — 71275 CT ANGIOGRAPHY CHEST: CPT | Mod: TC

## 2023-05-23 PROCEDURE — 71275 CT ANGIOGRAPHY CHEST: CPT | Mod: 26,,, | Performed by: RADIOLOGY

## 2023-05-23 PROCEDURE — 25500020 PHARM REV CODE 255: Performed by: INTERNAL MEDICINE

## 2023-05-23 PROCEDURE — 71275 CTA CHEST NON CORONARY (PE STUDIES): ICD-10-PCS | Mod: 26,,, | Performed by: RADIOLOGY

## 2023-05-23 RX ADMIN — IOHEXOL 100 ML: 350 INJECTION, SOLUTION INTRAVENOUS at 08:05

## 2023-05-23 NOTE — PROGRESS NOTES
I d/w pt-  I reviewed your CTA chest which showed no evidence of a pulmonary embolism.   Thoracic aorta normal caliber. Trace amount of scattered interstitial thickening  Incidentally it also showed a Mildly enlarged left thyroid lobe with substernal extension. This can be better visualized on a Thyroid Ultrasound.  Splenomegaly measuring 14.3 cm.   Dr. HUNT

## 2023-05-24 ENCOUNTER — HOSPITAL ENCOUNTER (OUTPATIENT)
Dept: RADIOLOGY | Facility: HOSPITAL | Age: 48
Discharge: HOME | End: 2023-05-24
Attending: INTERNAL MEDICINE
Payer: COMMERCIAL

## 2023-05-24 DIAGNOSIS — E04.2 MULTINODULAR GOITER: Primary | ICD-10-CM

## 2023-05-24 DIAGNOSIS — E01.0 THYROMEGALY: ICD-10-CM

## 2023-05-24 PROCEDURE — 76536 US EXAM OF HEAD AND NECK: CPT | Mod: TC

## 2023-05-24 PROCEDURE — 76536 US SOFT TISSUE HEAD NECK THYROID: ICD-10-PCS | Mod: 26,,, | Performed by: RADIOLOGY

## 2023-05-24 PROCEDURE — 76536 US EXAM OF HEAD AND NECK: CPT | Mod: 26,,, | Performed by: RADIOLOGY

## 2023-05-24 NOTE — PROGRESS NOTES
I d/w pt -  I reviewed your Thyroid Ultrasound which showed a multinodular goiter with a 2.7 cm nodule that meets criteria for an FNA will refer to Dr. Monique.    Dr. HUNT

## 2023-05-25 ENCOUNTER — OFFICE VISIT (OUTPATIENT)
Dept: CARDIOLOGY | Facility: CLINIC | Age: 48
End: 2023-05-25
Payer: COMMERCIAL

## 2023-05-25 VITALS
DIASTOLIC BLOOD PRESSURE: 74 MMHG | BODY MASS INDEX: 27.9 KG/M2 | SYSTOLIC BLOOD PRESSURE: 110 MMHG | WEIGHT: 184.06 LBS | HEART RATE: 68 BPM | HEIGHT: 68 IN

## 2023-05-25 DIAGNOSIS — I77.89 AORTIC ROOT ENLARGEMENT: ICD-10-CM

## 2023-05-25 DIAGNOSIS — R93.1 ABNORMAL ECHOCARDIOGRAM: Primary | ICD-10-CM

## 2023-05-25 PROCEDURE — 3074F PR MOST RECENT SYSTOLIC BLOOD PRESSURE < 130 MM HG: ICD-10-PCS | Mod: CPTII,S$GLB,, | Performed by: INTERNAL MEDICINE

## 2023-05-25 PROCEDURE — 99205 PR OFFICE/OUTPT VISIT, NEW, LEVL V, 60-74 MIN: ICD-10-PCS | Mod: S$GLB,,, | Performed by: INTERNAL MEDICINE

## 2023-05-25 PROCEDURE — 3074F SYST BP LT 130 MM HG: CPT | Mod: CPTII,S$GLB,, | Performed by: INTERNAL MEDICINE

## 2023-05-25 PROCEDURE — 1159F MED LIST DOCD IN RCRD: CPT | Mod: CPTII,S$GLB,, | Performed by: INTERNAL MEDICINE

## 2023-05-25 PROCEDURE — 1159F PR MEDICATION LIST DOCUMENTED IN MEDICAL RECORD: ICD-10-PCS | Mod: CPTII,S$GLB,, | Performed by: INTERNAL MEDICINE

## 2023-05-25 PROCEDURE — 3008F BODY MASS INDEX DOCD: CPT | Mod: CPTII,S$GLB,, | Performed by: INTERNAL MEDICINE

## 2023-05-25 PROCEDURE — 99205 OFFICE O/P NEW HI 60 MIN: CPT | Mod: S$GLB,,, | Performed by: INTERNAL MEDICINE

## 2023-05-25 PROCEDURE — 99999 PR PBB SHADOW E&M-EST. PATIENT-LVL III: ICD-10-PCS | Mod: PBBFAC,,, | Performed by: INTERNAL MEDICINE

## 2023-05-25 PROCEDURE — 3078F PR MOST RECENT DIASTOLIC BLOOD PRESSURE < 80 MM HG: ICD-10-PCS | Mod: CPTII,S$GLB,, | Performed by: INTERNAL MEDICINE

## 2023-05-25 PROCEDURE — 3008F PR BODY MASS INDEX (BMI) DOCUMENTED: ICD-10-PCS | Mod: CPTII,S$GLB,, | Performed by: INTERNAL MEDICINE

## 2023-05-25 PROCEDURE — 99999 PR PBB SHADOW E&M-EST. PATIENT-LVL III: CPT | Mod: PBBFAC,,, | Performed by: INTERNAL MEDICINE

## 2023-05-25 PROCEDURE — 3078F DIAST BP <80 MM HG: CPT | Mod: CPTII,S$GLB,, | Performed by: INTERNAL MEDICINE

## 2023-05-25 RX ORDER — MULTIVITAMIN
1 TABLET ORAL DAILY
COMMUNITY

## 2023-05-25 NOTE — PATIENT INSTRUCTIONS
Assessment/Plan:  Willy Bueno is a 47 y.o. male with overweight, former smoker, who presents for an initial appointment.    1. Enlarged Ascending Aorta- Echo shows mild enlargement of ascending aorta.  There appears to be lipomatous hypertrophy of the interatrial septum.  No aortic insufficiency.  CTA chest is unremarkable.  Given family history of bicuspid aortic valve in his father, will check CTA chest/abd/pelvis to rule out aortopathy.  Repeat echo in 1 year.      2. Overweight- Encourage diet, exercise, and weight loss.     Follow up in 2 months

## 2023-05-25 NOTE — PROGRESS NOTES
[facility]      Chief Complaint   Patient presents with    Shortness of Breath       Patient ID: Willy Bueno is a 47 y.o. male with overweight, former smoker, who presents for an initial appointment.  Pertinent history/events are as follows:     -Pt kindly referred by Dr. Talavera for evaluation of enlarged aorta.     HPI:  Mr. Bueno is accompanied by his wife.  He reports no chest pain or SOB.  He reports family history of bicuspid aortic valve in his father.  Formerly smoked a pack a day for 22 years.  Quit in 2018.  Echo on 2023 demonstrated EF 65%; normal left ventricular diastolic function; normal right ventricular size with normal right ventricular systolic function; mild tricuspid regurgitation; intermediate central venous pressure (8 mmHg); estimated PA systolic pressure is 30 mmHg; the sinuses of Valsalva is mildly dilated; there appears to be lipomatous hypertrophy of the interatrial septum.  CTA Chest on 2023 is unremarkable.      Past Medical History:   Diagnosis Date    Dislocated shoulder, left, sequela     x 2     Dislocated shoulder, right, sequela     Jaw fracture     Leg fracture     in childhood     Psoriasis     Supraspinatus tendon tear     left    Wrist fracture, closed      Past Surgical History:   Procedure Laterality Date    LIPOMA RESECTION      MOLE REMOVAL       Social History     Socioeconomic History    Marital status:    Tobacco Use    Smoking status: Former     Packs/day: 1.00     Years: 22.00     Pack years: 22.00     Types: Cigarettes     Quit date:      Years since quittin.3    Smokeless tobacco: Never   Substance and Sexual Activity    Alcohol use: Yes     Alcohol/week: 1.0 standard drink     Types: 1 Cans of beer per week     Comment: rare    Drug use: Never    Sexual activity: Yes     Partners: Female     Birth control/protection: None     Family History   Adopted: Yes   Problem Relation Age of Onset    Congenital heart  "disease Father         bicuspicd aortic valve    Alzheimer's disease Maternal Grandmother     Alzheimer's disease Maternal Aunt 53       Review of patient's allergies indicates:   Allergen Reactions    Bactrim [sulfamethoxazole-trimethoprim] Blisters    Doxycycline Nausea Only       Medication List with Changes/Refills   Current Medications    ALBUTEROL (PROVENTIL/VENTOLIN HFA) 90 MCG/ACTUATION INHALER    Inhale 2 puffs into the lungs every 6 (six) hours as needed for Wheezing or Shortness of Breath. Rescue    CALCIUM CARBONATE (TUMS) 200 MG CALCIUM (500 MG) CHEWABLE TABLET    Take 1 tablet by mouth as needed.    CALCIUM CARBONATE/VITAMIN D3 (VITAMIN D-3 ORAL)    Take 1 capsule by mouth every Mon, Wed, Fri.    CYANOCOBALAMIN, VITAMIN B-12, (VITAMIN B-12 ORAL)    Take 5,000 mcg by mouth once daily.    FAMOTIDINE (PEPCID) 20 MG TABLET    Take 20 mg by mouth daily as needed for Heartburn.    MONTELUKAST (SINGULAIR) 10 MG TABLET    Take 1 tablet (10 mg total) by mouth every evening.    MULTIVITAMIN (ONE DAILY MULTIVITAMIN) PER TABLET    Take 1 tablet by mouth once daily.    TRIAMCINOLONE ACETONIDE 0.1% (KENALOG) 0.1 % OINTMENT    Apply topically. Uses prn. Unsure of dosage       Review of Systems  Constitution: Denies chills, fever, and sweats.  HENT: Denies headaches or blurry vision.  Cardiovascular: Denies chest pain or irregular heart beat.  Respiratory: Denies cough or shortness of breath.  Gastrointestinal: Denies abdominal pain, nausea, or vomiting.  Musculoskeletal: Denies muscle cramps.  Neurological: Denies dizziness or focal weakness.  Psychiatric/Behavioral: Normal mental status.  Hematologic/Lymphatic: Denies bleeding problem or easy bruising/bleeding.  Skin: Denies rash or suspicious lesions    Physical Examination  /74   Pulse 68   Ht 5' 8" (1.727 m)   Wt 83.5 kg (184 lb 1.4 oz)   BMI 27.99 kg/m²     Constitutional: No acute distress, conversant  HEENT: Sclera anicteric, Pupils equal, round and " reactive to light, extraocular motions intact, Oropharynx clear  Neck: No JVD, no carotid bruits  Cardiovascular: regular rate and rhythm, no murmur, rubs or gallops, normal S1/S2  Pulmonary: Clear to auscultation bilaterally  Abdominal: Abdomen soft, nontender, nondistended, positive bowel sounds  Extremities: No lower extremity edema,   Pulses:  Carotid pulses are 2+ on the right side, and 2+ on the left side.  Radial pulses are 2+ on the right side, and 2+ on the left side.   Femoral pulses are 2+ on the right side, and 2+ on the left side.  Popliteal pulses are 2+ on the right side, and 2+ on the left side.   Dorsalis pedis pulses are 2+ on the right side, and 2+ on the left side.   Posterior tibial pulses are 2+ on the right side, and 2+ on the left side.    Skin: No ecchymosis, erythema, or ulcers  Psych: Alert and oriented x 3, appropriate affect  Neuro: CNII-XII intact, no focal deficits    Labs:  Most Recent Data  CBC:   Lab Results   Component Value Date    WBC 6.54 08/24/2022    HGB 15.3 08/24/2022    HCT 45.4 08/24/2022     (L) 08/24/2022    MCV 88 08/24/2022    RDW 11.9 08/24/2022     BMP:   Lab Results   Component Value Date     08/24/2022    K 4.0 08/24/2022     08/24/2022    CO2 27 08/24/2022    BUN 13 08/24/2022    CREATININE 0.8 08/24/2022    GLU 94 08/24/2022    CALCIUM 9.1 08/24/2022    MG 2.2 08/24/2022     LFTS;   Lab Results   Component Value Date    PROT 6.5 08/24/2022    ALBUMIN 4.1 08/24/2022    BILITOT 0.9 08/24/2022    AST 15 08/24/2022    ALKPHOS 77 08/24/2022    ALT 22 08/24/2022     COAGS: No results found for: INR, PROTIME, PTT  FLP:   Lab Results   Component Value Date    CHOL 169 08/24/2022    HDL 35 (L) 08/24/2022    LDLCALC 99.2 08/24/2022    TRIG 174 (H) 08/24/2022    CHOLHDL 20.7 08/24/2022     CARDIAC: No results found for: TROPONINI, CKMB, BNP    Imaging:    EKG 8/24/2022:  Sinus bradycardia    CTA Chest 5/23/2023:  Heart size within normal limits.    No  pericardial or pleural effusion.    Thoracic aorta normal caliber.    Mildly enlarged left thyroid lobe with substernal extension.  No lymphadenopathy.    Echo 6/19/2023:  Normal systolic function.  The estimated ejection fraction is 65%.  Normal left ventricular diastolic function.  Normal right ventricular size with normal right ventricular systolic function.  Mild tricuspid regurgitation.  Intermediate central venous pressure (8 mmHg).  The estimated PA systolic pressure is 30 mmHg.  The sinuses of Valsalva is mildly dilated.    Assessment/Plan:  Willy Bueno is a 47 y.o. male with overweight, former smoker, who presents for an initial appointment.    1. Enlarged Ascending Aorta- Echo shows mild enlargement of ascending aorta.  There appears to be lipomatous hypertrophy of the interatrial septum.  No aortic insufficiency.  CTA chest is unremarkable.  Given family history of bicuspid aortic valve in his father, will check CTA chest/abd/pelvis to rule out aortopathy.  Repeat echo in 1 year.      2. Overweight- Encourage diet, exercise, and weight loss.     Follow up in 2 months    Total duration of face to face visit time 45 minutes.  Total time spent counseling greater than fifty percent of total visit time.  Counseling included discussion regarding imaging findings, diagnosis, possibilities, treatment options, risks and benefits.  The patient had many questions regarding the options and long-term effects.    Dutch Hicks MD, PhD  Interventional Cardiology

## 2023-05-26 ENCOUNTER — TELEPHONE (OUTPATIENT)
Dept: ENDOCRINOLOGY | Facility: CLINIC | Age: 48
End: 2023-05-26
Payer: COMMERCIAL

## 2023-05-26 ENCOUNTER — PATIENT MESSAGE (OUTPATIENT)
Dept: PRIMARY CARE CLINIC | Facility: CLINIC | Age: 48
End: 2023-05-26
Payer: COMMERCIAL

## 2023-05-26 DIAGNOSIS — E04.1 THYROID NODULE: Primary | ICD-10-CM

## 2023-05-26 NOTE — TELEPHONE ENCOUNTER
----- Message from Lizzie Talavera MD sent at 5/24/2023  5:00 PM CDT -----  Sending your this pt.  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  He had a CTA chest which showed thyromegaly and I fu with an U/S which showed a 2.7 cm and rec FNA with you. I put in the referral.     Lizzie Garcia

## 2023-05-26 NOTE — TELEPHONE ENCOUNTER
I sw pt and informed him that it appears he will be getting a call from  office to set something up soon.

## 2023-05-26 NOTE — TELEPHONE ENCOUNTER
Please schedule FNA and visit same [place] [facility] [place] at 3PM/3:15PM. Thanks!

## 2023-05-31 ENCOUNTER — HOSPITAL ENCOUNTER (OUTPATIENT)
Dept: RADIOLOGY | Facility: HOSPITAL | Age: 48
Discharge: HOME | End: 2023-05-31
Attending: INTERNAL MEDICINE
Payer: COMMERCIAL

## 2023-05-31 DIAGNOSIS — R93.1 ABNORMAL ECHOCARDIOGRAM: ICD-10-CM

## 2023-05-31 DIAGNOSIS — I77.89 AORTIC ROOT ENLARGEMENT: ICD-10-CM

## 2023-05-31 PROCEDURE — 71275 CTA CHEST ABDOMEN PELVIS: ICD-10-PCS | Mod: 26,,, | Performed by: RADIOLOGY

## 2023-05-31 PROCEDURE — 74174 CTA ABD&PLVS W/CONTRAST: CPT | Mod: 26,,, | Performed by: RADIOLOGY

## 2023-05-31 PROCEDURE — 74174 CTA ABD&PLVS W/CONTRAST: CPT | Mod: TC

## 2023-05-31 PROCEDURE — 71275 CT ANGIOGRAPHY CHEST: CPT | Mod: 26,,, | Performed by: RADIOLOGY

## 2023-05-31 PROCEDURE — 74174 CTA CHEST ABDOMEN PELVIS: ICD-10-PCS | Mod: 26,,, | Performed by: RADIOLOGY

## 2023-05-31 PROCEDURE — 25500020 PHARM REV CODE 255: Performed by: INTERNAL MEDICINE

## 2023-05-31 PROCEDURE — 71275 CT ANGIOGRAPHY CHEST: CPT | Mod: TC

## 2023-05-31 RX ADMIN — IOHEXOL 100 ML: 350 INJECTION, SOLUTION INTRAVENOUS at 08:05

## 2023-06-05 ENCOUNTER — OFFICE VISIT (OUTPATIENT)
Dept: ENDOCRINOLOGY | Facility: CLINIC | Age: 48
End: 2023-06-05
Payer: COMMERCIAL

## 2023-06-05 ENCOUNTER — HOSPITAL ENCOUNTER (OUTPATIENT)
Dept: ENDOCRINOLOGY | Facility: CLINIC | Age: 48
Discharge: HOME | End: 2023-06-05
Attending: INTERNAL MEDICINE
Payer: COMMERCIAL

## 2023-06-05 VITALS
OXYGEN SATURATION: 98 % | WEIGHT: 184.5 LBS | HEIGHT: 68 IN | HEART RATE: 70 BPM | SYSTOLIC BLOOD PRESSURE: 110 MMHG | DIASTOLIC BLOOD PRESSURE: 90 MMHG | BODY MASS INDEX: 27.96 KG/M2

## 2023-06-05 DIAGNOSIS — E04.2 MULTINODULAR GOITER: ICD-10-CM

## 2023-06-05 DIAGNOSIS — E04.1 THYROID NODULE: Primary | ICD-10-CM

## 2023-06-05 DIAGNOSIS — E04.2 MULTIPLE THYROID NODULES: ICD-10-CM

## 2023-06-05 PROCEDURE — 88173 CYTOPATH EVAL FNA REPORT: CPT | Performed by: PATHOLOGY

## 2023-06-05 PROCEDURE — 10005 FNA BX W/US GDN 1ST LES: CPT | Mod: S$GLB,,, | Performed by: INTERNAL MEDICINE

## 2023-06-05 PROCEDURE — 99204 OFFICE O/P NEW MOD 45 MIN: CPT | Mod: S$GLB,,, | Performed by: INTERNAL MEDICINE

## 2023-06-05 PROCEDURE — 88173 PR  INTERPRETATION OF FNA SMEAR: ICD-10-PCS | Mod: 26,,, | Performed by: PATHOLOGY

## 2023-06-05 PROCEDURE — 88173 CYTOPATH EVAL FNA REPORT: CPT | Mod: 26,,, | Performed by: PATHOLOGY

## 2023-06-05 PROCEDURE — 99999 PR PBB SHADOW E&M-EST. PATIENT-LVL III: ICD-10-PCS | Mod: PBBFAC,,, | Performed by: INTERNAL MEDICINE

## 2023-06-05 PROCEDURE — 10005 US FINE NEEDLE ASPIRATION THYROID, FIRST LESION: ICD-10-PCS | Mod: S$GLB,,, | Performed by: INTERNAL MEDICINE

## 2023-06-05 PROCEDURE — 99999 PR PBB SHADOW E&M-EST. PATIENT-LVL III: CPT | Mod: PBBFAC,,, | Performed by: INTERNAL MEDICINE

## 2023-06-05 PROCEDURE — 99204 PR OFFICE/OUTPT VISIT, NEW, LEVL IV, 45-59 MIN: ICD-10-PCS | Mod: S$GLB,,, | Performed by: INTERNAL MEDICINE

## 2023-06-05 NOTE — ASSESSMENT & PLAN NOTE
I have reviewed management options including observation or FNA.  All of the patients questions were answered.     Discussed indications for a FNA  Discussed possible FNA results (benign, FLUS,  follicular or hurthle lesion, suspicious for cancer, cancer and non diagnostic)     Reviewed that a non diagnostic or FLUS would require a repeat FNA or molecular marker testing.    Will proceed with FNA of left inferior 2.7 cm thyroid nodule    Discussed indications for repeat FNA as well as surgical indications (abnormal FNA, compressive symptoms or interval change)     If FNA is negative then plan follow up in 1 year with TSH and thyroid u/s prior

## 2023-06-05 NOTE — PROGRESS NOTES
NEW PATIENT VISIT    Subjective:      Chief Complaint: Thyroid nodule    HPI: Willy Bueno is a 47 y.o. male who is here for thyroid nodule      Past Medical History:   Diagnosis Date    Dislocated shoulder, left, sequela     x 2     Dislocated shoulder, right, sequela     Jaw fracture     Leg fracture     in childhood     Psoriasis     Supraspinatus tendon tear     left    Wrist fracture, closed          With regards to the thyroid nodule:    Thyroid nodules originally found on CT scan of the chest in 5/2023 , and subsequently evaluated by ultrasound on 5/24/2023.    Last ultrasound in 5/2023 was independently reviewed:     2.7 cm Left lower lobe nodule. The nodule is solid with isoechoic echotexture. Vascularity is Grade 3 (penetrating). Borders are regular. Microcalcifications are not present.    0.8 cm Left middle lobe nodule. The nodule is solid with isoechoic echotexture. Vascularity is Grade 3 (penetrating). Borders are regular. Microcalcifications are not present.    Tiny hypoechoic/cystic nodule on the right that doesn't need follow-up.       No   Yes  [x]    [] Preexisting thyroid disease    Compressive Symptoms:  No  Yes  [x]    [] Anterior neck pressure  [x]    [] Dysphagia  [x]    [] Voice changes    Risk Factors:  No   Yes  [x]    [] Radiation to head or neck for any treatment of cancer or exposure to radiation  [x]    [] Personal history of colon or breast cancer  []    [x] Tobacco use - former smoker 22 p-y; quit 2018  [x]    [] Family history of thyroid cancer     Previous biopsy results: None    Lab Results   Component Value Date    TSH 0.839 08/24/2022    TSH 0.558 01/14/2021         Reviewed past medical, family, social history and updated as appropriate.    Objective:     Vitals:    06/05/23 1523   BP: (!) 110/90   Pulse: 70         BP Readings from Last 5 Encounters:   06/05/23 (!) 110/90   05/25/23 110/74   05/15/23 107/70   05/08/23 109/78   12/30/22 110/78         Physical Exam  Vitals  and nursing note reviewed.   Constitutional:       General: He is not in acute distress.     Appearance: He is well-developed. He is not ill-appearing.   HENT:      Head: Normocephalic and atraumatic.   Eyes:      General:         Right eye: No discharge.         Left eye: No discharge.      Conjunctiva/sclera: Conjunctivae normal.   Neck:      Thyroid: Thyroid mass (left inferior nodule palpable - mobile with swallowing, soft, non-tender) present. No thyromegaly.      Trachea: No tracheal deviation.   Pulmonary:      Effort: Pulmonary effort is normal. No respiratory distress.   Musculoskeletal:      Comments: No digital clubbing or extremity cyanosis   Neurological:      Mental Status: He is alert and oriented to person, place, and time.      Coordination: Coordination normal.   Psychiatric:         Mood and Affect: Mood normal.         Behavior: Behavior normal.         Wt Readings from Last 3 Encounters:   06/05/23 1523 83.7 kg (184 lb 8.4 oz)   05/25/23 1054 83.5 kg (184 lb 1.4 oz)   05/19/23 1536 83.9 kg (185 lb)       Assessment/Plan:     Multiple thyroid nodules  I have reviewed management options including observation or FNA.  All of the patients questions were answered.     Discussed indications for a FNA  Discussed possible FNA results (benign, FLUS,  follicular or hurthle lesion, suspicious for cancer, cancer and non diagnostic)     Reviewed that a non diagnostic or FLUS would require a repeat FNA or molecular marker testing.    Will proceed with FNA of left inferior 2.7 cm thyroid nodule    Discussed indications for repeat FNA as well as surgical indications (abnormal FNA, compressive symptoms or interval change)     If FNA is negative then plan follow up in 1 year with TSH and thyroid u/s prior      Follow up in about 1 year (around 6/5/2024).

## 2023-06-07 ENCOUNTER — PATIENT MESSAGE (OUTPATIENT)
Dept: ENDOCRINOLOGY | Facility: CLINIC | Age: 48
End: 2023-06-07
Payer: COMMERCIAL

## 2023-06-07 LAB
FINAL PATHOLOGIC DIAGNOSIS: NORMAL
Lab: NORMAL

## 2023-06-26 DIAGNOSIS — J00 ACUTE RHINITIS: ICD-10-CM

## 2023-06-26 RX ORDER — MONTELUKAST SODIUM 10 MG/1
10 TABLET ORAL NIGHTLY
Qty: 30 TABLET | Refills: 5 | Status: SHIPPED | OUTPATIENT
Start: 2023-06-26 | End: 2024-03-11

## 2023-06-26 NOTE — TELEPHONE ENCOUNTER
No care due was identified.  [facility] Embedded Care Due Messages. Reference number: [id number].   6/26/2023 9:17:12 AM CDT

## 2023-08-09 ENCOUNTER — OFFICE VISIT (OUTPATIENT)
Dept: CARDIOLOGY | Facility: CLINIC | Age: 48
End: 2023-08-09
Payer: COMMERCIAL

## 2023-08-09 VITALS
BODY MASS INDEX: 26.9 KG/M2 | WEIGHT: 177.5 LBS | HEIGHT: 68 IN | SYSTOLIC BLOOD PRESSURE: 106 MMHG | DIASTOLIC BLOOD PRESSURE: 74 MMHG | HEART RATE: 59 BPM

## 2023-08-09 DIAGNOSIS — R07.9 ACUTE CHEST PAIN: ICD-10-CM

## 2023-08-09 DIAGNOSIS — R00.2 PALPITATIONS: Primary | ICD-10-CM

## 2023-08-09 DIAGNOSIS — R93.1 ABNORMAL ECHOCARDIOGRAM: ICD-10-CM

## 2023-08-09 DIAGNOSIS — I77.89 AORTIC ROOT ENLARGEMENT: ICD-10-CM

## 2023-08-09 PROCEDURE — 99999 PR PBB SHADOW E&M-EST. PATIENT-LVL III: CPT | Mod: PBBFAC,,, | Performed by: INTERNAL MEDICINE

## 2023-08-09 PROCEDURE — 99999 PR PBB SHADOW E&M-EST. PATIENT-LVL III: ICD-10-PCS | Mod: PBBFAC,,, | Performed by: INTERNAL MEDICINE

## 2023-08-09 PROCEDURE — 99215 PR OFFICE/OUTPT VISIT, EST, LEVL V, 40-54 MIN: ICD-10-PCS | Mod: S$GLB,,, | Performed by: INTERNAL MEDICINE

## 2023-08-09 PROCEDURE — 99215 OFFICE O/P EST HI 40 MIN: CPT | Mod: S$GLB,,, | Performed by: INTERNAL MEDICINE

## 2023-08-09 NOTE — PROGRESS NOTES
[facility]      Chief Complaint   Patient presents with    Abnormal echocardiogram        Patient ID: Willy Bueno is a 47 y.o. male with overweight, former smoker, who presents for a follow up  appointment.  Pertinent history/events are as follows:     -Pt kindly referred by Dr. Talavera for evaluation of enlarged aorta.     - At clinic visit on  5/25/2023 Mr. Bueno is accompanied by his wife.  He reports no chest pain or SOB.  He reports family history of bicuspid aortic valve in his father.  Formerly smoked a pack a day for 22 years.  Quit in 2018.  Echo on 6/19/2023 demonstrated EF 65%; normal left ventricular diastolic function; normal right ventricular size with normal right ventricular systolic function; mild tricuspid regurgitation; intermediate central venous pressure (8 mmHg); estimated PA systolic pressure is 30 mmHg; the sinuses of Valsalva is mildly dilated; there appears to be lipomatous hypertrophy of the interatrial septum.  CTA Chest on 5/23/2023 is unremarkable.    Plan:  Enlarged Ascending Aorta- Echo shows mild enlargement of ascending aorta.  There appears to be lipomatous hypertrophy of the interatrial septum.  No aortic insufficiency.  CTA chest is unremarkable.  Given family history of bicuspid aortic valve in his father, will check CTA chest/abd/pelvis to rule out aortopathy.  Repeat echo in 1 year.    Overweight- Encourage diet, exercise, and weight loss.   Follow up in 2 months    HPI:  Mr. Bueno reports no chest pain, palpitations, light headedness or SOB. H reports blood pressure is 135/85 at home most of the time. Echo on 5/19/2023 revealed normal systolic function with EF of 65%, Normal left ventricular diastolic function. PA systolic pressure is 30 mmHg. Sinuses of Valsalva is mildly dilated. CTA Chest/Abdomen/Pelvis on 5/31/2023 revealed 3 vessel arch.  The proximal arch vessels are patent.  The thoracic aorta tapers normally.  The celiac axis, SMA,  bilateral renal arteries, bilateral accessory renal arteries, DELLA, and distal aorto iliac branches all are patent.  No findings to suggest aneurysm or dissection. No pulmonary thromboembolism. Right hepatic lobe lesion suggests hemangioma, smaller lesion within the hepatic dome may reflect flash filling hemangioma although incompletely characterized.    Past Medical History:   Diagnosis Date    Dislocated shoulder, left, sequela     x 2     Dislocated shoulder, right, sequela     Jaw fracture     Leg fracture     in childhood     Psoriasis     Supraspinatus tendon tear     left    Wrist fracture, closed      Past Surgical History:   Procedure Laterality Date    LIPOMA RESECTION      MOLE REMOVAL       Social History     Socioeconomic History    Marital status:    Tobacco Use    Smoking status: Former     Current packs/day: 0.00     Average packs/day: 1 pack/day for 22.0 years (22.0 ttl pk-yrs)     Types: Cigarettes     Start date:      Quit date:      Years since quittin.6    Smokeless tobacco: Never   Substance and Sexual Activity    Alcohol use: Yes     Alcohol/week: 1.0 standard drink of alcohol     Types: 1 Cans of beer per week     Comment: rare    Drug use: Never    Sexual activity: Yes     Partners: Female     Birth control/protection: None     Family History   Adopted: Yes   Problem Relation Age of Onset    Congenital heart disease Father         bicuspicd aortic valve    Alzheimer's disease Maternal Grandmother     Alzheimer's disease Maternal Aunt 53       Review of patient's allergies indicates:   Allergen Reactions    Bactrim [sulfamethoxazole-trimethoprim] Blisters    Doxycycline Nausea Only       Medication List with Changes/Refills   Current Medications    ALBUTEROL (PROVENTIL/VENTOLIN HFA) 90 MCG/ACTUATION INHALER    Inhale 2 puffs into the lungs every 6 (six) hours as needed for Wheezing or Shortness of Breath. Rescue    CALCIUM CARBONATE (TUMS) 200 MG CALCIUM (500 MG)  "CHEWABLE TABLET    Take 1 tablet by mouth as needed.    CALCIUM CARBONATE/VITAMIN D3 (VITAMIN D-3 ORAL)    Take 1 capsule by mouth every Mon, Wed, Fri.    CYANOCOBALAMIN, VITAMIN B-12, (VITAMIN B-12 ORAL)    Take 5,000 mcg by mouth once daily.    FAMOTIDINE (PEPCID) 20 MG TABLET    Take 20 mg by mouth daily as needed for Heartburn.    MONTELUKAST (SINGULAIR) 10 MG TABLET    Take 1 tablet (10 mg total) by mouth every evening.    MULTIVITAMIN (THERAGRAN) PER TABLET    Take 1 tablet by mouth once daily.    TRIAMCINOLONE ACETONIDE 0.1% (KENALOG) 0.1 % OINTMENT    Apply topically. Uses prn. Unsure of dosage       Review of Systems  Constitution: Denies chills, fever, and sweats.  HENT: Denies headaches or blurry vision.  Cardiovascular: Denies chest pain or irregular heart beat.  Respiratory: Denies cough or shortness of breath.  Gastrointestinal: Denies abdominal pain, nausea, or vomiting.  Musculoskeletal: Denies muscle cramps.  Neurological: Denies dizziness or focal weakness.  Psychiatric/Behavioral: Normal mental status.  Hematologic/Lymphatic: Denies bleeding problem or easy bruising/bleeding.  Skin: Denies rash or suspicious lesions    Physical Examination  /74   Pulse (!) 59   Ht 5' 8" (1.727 m)   Wt 80.5 kg (177 lb 7.5 oz)   BMI 26.98 kg/m²     Constitutional: No acute distress, conversant  HEENT: Sclera anicteric, Pupils equal, round and reactive to light, extraocular motions intact, Oropharynx clear  Neck: No JVD, no carotid bruits  Cardiovascular: regular rate and rhythm, no murmur, rubs or gallops, normal S1/S2  Pulmonary: Clear to auscultation bilaterally  Abdominal: Abdomen soft, nontender, nondistended, positive bowel sounds  Extremities: No lower extremity edema,   Pulses:  Carotid pulses are 2+ on the right side, and 2+ on the left side.  Radial pulses are 2+ on the right side, and 2+ on the left side.   Femoral pulses are 2+ on the right side, and 2+ on the left side.  Popliteal pulses are 2+ " "on the right side, and 2+ on the left side.   Dorsalis pedis pulses are 2+ on the right side, and 2+ on the left side.   Posterior tibial pulses are 2+ on the right side, and 2+ on the left side.    Skin: No ecchymosis, erythema, or ulcers  Psych: Alert and oriented x 3, appropriate affect  Neuro: CNII-XII intact, no focal deficits    Labs:  Most Recent Data  CBC:   Lab Results   Component Value Date    WBC 6.54 08/24/2022    HGB 15.3 08/24/2022    HCT 45.4 08/24/2022     (L) 08/24/2022    MCV 88 08/24/2022    RDW 11.9 08/24/2022     BMP:   Lab Results   Component Value Date     08/24/2022    K 4.0 08/24/2022     08/24/2022    CO2 27 08/24/2022    BUN 13 08/24/2022    CREATININE 0.8 08/24/2022    GLU 94 08/24/2022    CALCIUM 9.1 08/24/2022    MG 2.2 08/24/2022     LFTS;   Lab Results   Component Value Date    PROT 6.5 08/24/2022    ALBUMIN 4.1 08/24/2022    BILITOT 0.9 08/24/2022    AST 15 08/24/2022    ALKPHOS 77 08/24/2022    ALT 22 08/24/2022     COAGS: No results found for: "INR", "PROTIME", "PTT"  FLP:   Lab Results   Component Value Date    CHOL 169 08/24/2022    HDL 35 (L) 08/24/2022    LDLCALC 99.2 08/24/2022    TRIG 174 (H) 08/24/2022    CHOLHDL 20.7 08/24/2022     CARDIAC: No results found for: "TROPONINI", "CKTOTAL", "CKMB", "BNP"    Imaging:    Echo 5/19/2023  Normal systolic function.  The estimated ejection fraction is 65%.  Normal left ventricular diastolic function.  Normal right ventricular size with normal right ventricular systolic function.  Mild tricuspid regurgitation.  Intermediate central venous pressure (8 mmHg).  The estimated PA systolic pressure is 30 mmHg.  The sinuses of Valsalva is mildly dilated.    CTA Chest/Abdomen/Pelvis 5/31/2023    There is a 3 vessel arch.  The proximal arch vessels are patent.  The thoracic aorta tapers normally.  The celiac axis, SMA, bilateral renal arteries, bilateral accessory renal arteries, DELLA, and distal aorto iliac branches all are " patent.  No findings to suggest aneurysm or dissection.   No pulmonary thromboembolism.   Right hepatic lobe lesion suggests hemangioma, smaller lesion within the hepatic dome may reflect flash filling hemangioma although incompletely characterized.    EKG 8/24/2022:  Sinus bradycardia    CTA Chest 5/23/2023:  Heart size within normal limits.    No pericardial or pleural effusion.    Thoracic aorta normal caliber.    Mildly enlarged left thyroid lobe with substernal extension.  No lymphadenopathy.    Echo 6/19/2023:  Normal systolic function.  The estimated ejection fraction is 65%.  Normal left ventricular diastolic function.  Normal right ventricular size with normal right ventricular systolic function.  Mild tricuspid regurgitation.  Intermediate central venous pressure (8 mmHg).  The estimated PA systolic pressure is 30 mmHg.  The sinuses of Valsalva is mildly dilated.    Assessment/Plan:  Willy Bueno is a 47 y.o. male with overweight, former smoker, who presents for a follow up appointment.    1. Enlarged Ascending Aorta- Echo shows mild enlargement of ascending aorta.  There appears to be lipomatous hypertrophy of the interatrial septum.  No aortic insufficiency.  CTA chest is unremarkable.  Given family history of bicuspid aortic valve in his father. Echo on 5/19/2023 revealed normal systolic function with EF of 65%, Normal left ventricular diastolic function. PA systolic pressure is 30 mmHg. Sinuses of Valsalva is mildly dilated. CTA Chest/Abdomen/Pelvis on 5/31/2023 revealed 3 vessel arch.  The proximal arch vessels are patent.  The thoracic aorta tapers normally.  The celiac axis, SMA, bilateral renal arteries, bilateral accessory renal arteries, DELLA, and distal aorto iliac branches all are patent.  No findings to suggest aneurysm or dissection. No pulmonary thromboembolism. Right hepatic lobe lesion suggests hemangioma, smaller lesion within the hepatic dome may reflect flash filling hemangioma  although incompletely characterized.  Repeat echo in 1 year.      2. Overweight- Encourage diet, exercise, and weight loss.     3. High blood pressure: Blood pressure in 135/85 at home. Pt to limit sodium 2000 mg/day. Patient to keep a log of blood pressure and heart rate at home and bring in next clinic visit. Report to the clinic if blood pressure greater than 140 or less than 100 consistently or experience light headedness/syncope related symptoms. LDL 92 on 8/24/22.    Follow up in 6 months    Total duration of face to face visit time 45 minutes.  Total time spent counseling greater than fifty percent of total visit time.  Counseling included discussion regarding imaging findings, diagnosis, possibilities, treatment options, risks and benefits.  The patient had many questions regarding the options and long-term effects.    Patient seen and discussed with Dr. Hicks. Further recommendations per the attending addendum.    Kwesi Mares MD  PGY IV - Vascular Medicine Fellow   [facility]

## 2023-08-09 NOTE — PATIENT INSTRUCTIONS
Assessment/Plan:  Willy Bueno is a 47 y.o. male with overweight, former smoker, who presents for a follow up appointment.    1. Enlarged Ascending Aorta- Echo shows mild enlargement of ascending aorta.  There appears to be lipomatous hypertrophy of the interatrial septum.  No aortic insufficiency.  CTA chest is unremarkable.  Given family history of bicuspid aortic valve in his father. Echo on 5/19/2023 revealed normal systolic function with EF of 65%, Normal left ventricular diastolic function. PA systolic pressure is 30 mmHg. Sinuses of Valsalva is mildly dilated. CTA Chest/Abdomen/Pelvis on 5/31/2023 revealed 3 vessel arch.  The proximal arch vessels are patent.  The thoracic aorta tapers normally.  The celiac axis, SMA, bilateral renal arteries, bilateral accessory renal arteries, DELLA, and distal aorto iliac branches all are patent.  No findings to suggest aneurysm or dissection. No pulmonary thromboembolism. Right hepatic lobe lesion suggests hemangioma, smaller lesion within the hepatic dome may reflect flash filling hemangioma although incompletely characterized.  Repeat echo in 1 year.      2. Overweight- Encourage diet, exercise, and weight loss.     3. High blood pressure: Blood pressure in 135/85 at home. Pt to limit sodium 2000 mg/day. Patient to keep a log of blood pressure and heart rate at home and bring in next clinic visit. Report to the clinic if blood pressure greater than 140 or less than 100 consistently or experience light headedness/syncope related symptoms. LDL 92 on 8/24/22.    Follow up in 6 months

## 2023-09-08 ENCOUNTER — OFFICE VISIT (OUTPATIENT)
Dept: PRIMARY CARE CLINIC | Facility: CLINIC | Age: 48
End: 2023-09-08
Payer: COMMERCIAL

## 2023-09-08 VITALS
HEART RATE: 57 BPM | OXYGEN SATURATION: 98 % | RESPIRATION RATE: 16 BRPM | WEIGHT: 180.31 LBS | SYSTOLIC BLOOD PRESSURE: 110 MMHG | HEIGHT: 68 IN | BODY MASS INDEX: 27.33 KG/M2 | TEMPERATURE: 98 F | DIASTOLIC BLOOD PRESSURE: 84 MMHG

## 2023-09-08 DIAGNOSIS — J45.20 MILD INTERMITTENT ASTHMA WITHOUT COMPLICATION: ICD-10-CM

## 2023-09-08 DIAGNOSIS — Z13.220 SCREENING FOR LIPOID DISORDERS: ICD-10-CM

## 2023-09-08 DIAGNOSIS — E04.2 MULTINODULAR GOITER: ICD-10-CM

## 2023-09-08 DIAGNOSIS — E53.8 VITAMIN B12 DEFICIENCY: ICD-10-CM

## 2023-09-08 DIAGNOSIS — Z00.00 NORMAL PHYSICAL EXAM, ROUTINE: Primary | ICD-10-CM

## 2023-09-08 DIAGNOSIS — Z12.5 PROSTATE CANCER SCREENING: ICD-10-CM

## 2023-09-08 PROCEDURE — 99396 PR PREVENTIVE VISIT,EST,40-64: ICD-10-PCS | Mod: S$GLB,,, | Performed by: INTERNAL MEDICINE

## 2023-09-08 PROCEDURE — 99999 PR PBB SHADOW E&M-EST. PATIENT-LVL IV: ICD-10-PCS | Mod: PBBFAC,,, | Performed by: INTERNAL MEDICINE

## 2023-09-08 PROCEDURE — 1160F PR REVIEW ALL MEDS BY PRESCRIBER/CLIN PHARMACIST DOCUMENTED: ICD-10-PCS | Mod: CPTII,S$GLB,, | Performed by: INTERNAL MEDICINE

## 2023-09-08 PROCEDURE — 99999 PR PBB SHADOW E&M-EST. PATIENT-LVL IV: CPT | Mod: PBBFAC,,, | Performed by: INTERNAL MEDICINE

## 2023-09-08 PROCEDURE — 1159F PR MEDICATION LIST DOCUMENTED IN MEDICAL RECORD: ICD-10-PCS | Mod: CPTII,S$GLB,, | Performed by: INTERNAL MEDICINE

## 2023-09-08 PROCEDURE — 3074F SYST BP LT 130 MM HG: CPT | Mod: CPTII,S$GLB,, | Performed by: INTERNAL MEDICINE

## 2023-09-08 PROCEDURE — 3079F PR MOST RECENT DIASTOLIC BLOOD PRESSURE 80-89 MM HG: ICD-10-PCS | Mod: CPTII,S$GLB,, | Performed by: INTERNAL MEDICINE

## 2023-09-08 PROCEDURE — 99396 PREV VISIT EST AGE 40-64: CPT | Mod: S$GLB,,, | Performed by: INTERNAL MEDICINE

## 2023-09-08 PROCEDURE — 3008F BODY MASS INDEX DOCD: CPT | Mod: CPTII,S$GLB,, | Performed by: INTERNAL MEDICINE

## 2023-09-08 PROCEDURE — 1160F RVW MEDS BY RX/DR IN RCRD: CPT | Mod: CPTII,S$GLB,, | Performed by: INTERNAL MEDICINE

## 2023-09-08 PROCEDURE — 3074F PR MOST RECENT SYSTOLIC BLOOD PRESSURE < 130 MM HG: ICD-10-PCS | Mod: CPTII,S$GLB,, | Performed by: INTERNAL MEDICINE

## 2023-09-08 PROCEDURE — 3008F PR BODY MASS INDEX (BMI) DOCUMENTED: ICD-10-PCS | Mod: CPTII,S$GLB,, | Performed by: INTERNAL MEDICINE

## 2023-09-08 PROCEDURE — 3079F DIAST BP 80-89 MM HG: CPT | Mod: CPTII,S$GLB,, | Performed by: INTERNAL MEDICINE

## 2023-09-08 PROCEDURE — 1159F MED LIST DOCD IN RCRD: CPT | Mod: CPTII,S$GLB,, | Performed by: INTERNAL MEDICINE

## 2023-09-08 RX ORDER — ALBUTEROL SULFATE 90 UG/1
2 AEROSOL, METERED RESPIRATORY (INHALATION) EVERY 6 HOURS PRN
Qty: 18 G | Refills: 3 | Status: SHIPPED | OUTPATIENT
Start: 2023-09-08 | End: 2024-03-04

## 2023-09-08 NOTE — PROGRESS NOTES
[facility] Note    Chief Complaint      Chief Complaint   Patient presents with    Annual Exam       History of Present Illness      Willy Bueno is a 47 y.o. WM with Psoriasis, Hypertriglyceridemia, Nicotine dependence in remission presents to fu chronic issues. Referred by wife.  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  Last visit - 5/15/23    Lipomatous hypertrophy of the interatrial septum  -Echo - 5/19/23 - Normal EF -65%. Trace MR, mild Tricuspid regurgitation.There appears to be lipomatous hypertrophy of the interatrial septum.   Sinuses of Valsalva mildly dilated. Planning to repeat Echo - 1 yr. Fu with Dr. Hicks - Feb. CTA chest - 5/23/23 - no evidence of a pulmonary embolism.   Thoracic aorta normal caliber. Trace amount of scattered interstitial thickening  Incidentally it also showed a Mildly enlarged left thyroid lobe with substernal extension.  Splenomegaly measuring 14.3 cm. CTA CAP - 5/31/23 - No findings to suggest aortic aneurysm or dissection.No pulmonary thromboembolism. Right hepatic lobe lesion suggests hemangioma, smaller lesion within the hepatic dome may reflect flash filling hemangioma although incompletely characterized.    Thyroid nodule - Thyroid U/S - 5/24/23 - 2.7 cm and rec FNA Fu with Dr. Monique. FNA - 6/5/23 - benign. Plan follow up in 1 year with TSH and thyroid u/s prior.     Thrombocytopenia-  platelets were just below normal at 141. This is not a concerning level. Will cont to  monitor.      Vit B12 def - B12 386 - 5/2/23. Cont. Vit B12 1000 mcg/d.     Vit D def - Vit D 24. Cont.  Vit D3 1000 u/d.      Low HDL - Total Cholesterol and bad cholesterol (LDL)  looked good but the good cholesterol (HDL) was low.  Exercise can help to increase this level.  He does not require medication for this.   Hypertriglyceridemia.  It has been as high as 400 in past.      ADD - d/x ADD in 1997 or 1998. He was on medication in college but stopped taking it around 2003. Rec formal  testing so can Rx pharmacotherapy if needed in future.      Psoriasis - Pt on topicals per Dr. Nathaniel Hernandez     GERD - EGD - Grade I esophagitis, Gastritis, Duodenitis.  Rec reflux prec.Takes Pepcid prn.      Asthma - SOB much better.  +h/o Exercise induced asthma in teens. He reports worsening of Sx's after recently cutting the grass last wk. ?allergic triggers. Will refill albuterol - has not been needing recently. Singulair has helped.   Rec consider PCV 20.     Colon Polyps - repeat 2/10/23 -  C-scope 3 yrs.      Fam h/o Bicuspid aortic valve and aneurysm - No evid. of this on recent Echo for pt.       HCM - Flu - 10/17/22;  Tdap - 10/17/18; PCV 20 - none- defers; Shingrix - due at 52 y.o.;  COVID -19 -Vaccine 1/12/21; # 2 2/3/21; # 3 12/27/21; #4 12/27/22;  Hep C Screen - 8/4/22 - neg.; HIV Screen - utd - neg.; C-scope - 2/10/23- polyps - repeat 3 yrs;  PSA - ; Prev PCP - Dr. Elaine;  - Dr. Campbell; Ortho - Dr. Suárez; Derm - Dr. Prado; Ophtho -plans to see Dr. Jones; GI - Dr. Flowers/Dr. Goodson;  - Dr. Campbell; Well visit - 9/8/23    Patient Care Team:  Lizzie Talavera MD as PCP - General (Internal Medicine)  Lisette Altamirano MA as Care Coordinator     Health Maintenance:  Immunization History   Administered Date(s) Administered    COVID-19, MRNA, LN-S, PF (MODERNA FULL 0.5 ML DOSE) 12/27/2021    COVID-19, MRNA, LN-S, PF (Pfizer) (Purple Cap) 01/12/2021, 02/03/2021, 12/27/2021    COVID-19, mRNA, LNP-S, bivalent booster, PF (PFIZER OMICRON) 12/27/2022    Influenza (FLUBLOK) - Quadrivalent - Recombinant - PF *Preferred* (egg allergy) 10/01/2020    Influenza - Quadrivalent 10/10/2019    Influenza - Quadrivalent - PF *Preferred* (6 months and older) 02/11/2019, 10/10/2019, 10/08/2021, 10/17/2022    Tdap 10/17/2018      Health Maintenance   Topic Date Due    Colorectal Cancer Screening  02/10/2026    Lipid Panel  08/24/2027    TETANUS VACCINE  10/17/2028    Hepatitis C Screening   Completed        Past Medical History:  Past Medical History:   Diagnosis Date    COVID-19     2021; 22    Dislocated shoulder, left, sequela     x 2     Dislocated shoulder, right, sequela     Jaw fracture     Leg fracture     in childhood     Psoriasis     Supraspinatus tendon tear     left    Wrist fracture, closed        Past Surgical History:   has a past surgical history that includes Lipoma resection () and Mole removal.    Family History:  family history includes Alzheimer's disease in his maternal grandmother; Alzheimer's disease (age of onset: 53) in his maternal aunt; Congenital heart disease in his father. He was adopted.     Social History:  Social History     Tobacco Use    Smoking status: Former     Current packs/day: 0.00     Average packs/day: 1 pack/day for 22.0 years (22.0 ttl pk-yrs)     Types: Cigarettes     Start date:      Quit date:      Years since quittin.6    Smokeless tobacco: Never   Substance Use Topics    Alcohol use: Yes     Alcohol/week: 1.0 standard drink of alcohol     Types: 1 Cans of beer per week     Comment: rare    Drug use: Never       Review of Systems   Constitutional:  Negative for chills, diaphoresis and fever.   HENT:  Positive for nasal congestion. Negative for sore throat.         Slight congestion.    Respiratory:  Negative for cough.    Cardiovascular:  Negative for chest pain and palpitations.   Gastrointestinal:  Positive for diarrhea. Negative for abdominal pain, constipation, nausea and vomiting.        Loose stool x 1 wk   Endocrine: Negative for cold intolerance and heat intolerance.   Genitourinary:         No hesitancy and no nocturia.     Musculoskeletal:  Negative for arthralgias and myalgias.   Neurological:  Negative for dizziness and headaches.   Psychiatric/Behavioral:  Negative for dysphoric mood. The patient is not nervous/anxious.         Medications:    Current Outpatient Medications:     calcium carbonate (TUMS) 200 mg  "calcium (500 mg) chewable tablet, Take 1 tablet by mouth as needed., Disp: , Rfl:     calcium carbonate/vitamin D3 (VITAMIN D-3 ORAL), Take 1 capsule by mouth every Mon, Wed, Fri., Disp: , Rfl:     cyanocobalamin, vitamin B-12, (VITAMIN B-12 ORAL), Take 5,000 mcg by mouth once daily., Disp: , Rfl:     famotidine (PEPCID) 20 MG tablet, Take 20 mg by mouth daily as needed for Heartburn., Disp: , Rfl:     montelukast (SINGULAIR) 10 mg tablet, Take 1 tablet (10 mg total) by mouth every evening., Disp: 30 tablet, Rfl: 5    multivitamin (THERAGRAN) per tablet, Take 1 tablet by mouth once daily., Disp: , Rfl:     triamcinolone acetonide 0.1% (KENALOG) 0.1 % ointment, Apply topically. Uses prn. Unsure of dosage, Disp: , Rfl:     albuterol (PROVENTIL/VENTOLIN HFA) 90 mcg/actuation inhaler, Inhale 2 puffs into the lungs every 6 (six) hours as needed for Wheezing or Shortness of Breath. Rescue, Disp: 18 g, Rfl: 3  No current facility-administered medications for this visit.     Allergies:  Review of patient's allergies indicates:   Allergen Reactions    Bactrim [sulfamethoxazole-trimethoprim] Blisters    Doxycycline Nausea Only       Physical Exam      Vital Signs  Temp: 98.3 °F (36.8 °C)  Temp Source: Oral  Pulse: (!) 57  Resp: 16  SpO2: 98 %  BP: 110/84  BP Location: Left arm  Patient Position: Sitting  Pain Score: 0-No pain  Height and Weight  Height: 5' 8" (172.7 cm)  Weight: 81.8 kg (180 lb 5.4 oz)  BSA (Calculated - sq m): 1.98 sq meters  BMI (Calculated): 27.4  Weight in (lb) to have BMI = 25: 164.1      Patient Position: Sitting      Physical Exam  Vitals reviewed.   Constitutional:       General: He is not in acute distress.     Appearance: Normal appearance. He is not ill-appearing, toxic-appearing or diaphoretic.   HENT:      Head: Normocephalic and atraumatic.      Right Ear: Tympanic membrane normal.      Left Ear: Tympanic membrane normal.      Mouth/Throat:      Mouth: Mucous membranes are moist.      Pharynx: " No oropharyngeal exudate or posterior oropharyngeal erythema.   Eyes:      Extraocular Movements: Extraocular movements intact.      Conjunctiva/sclera: Conjunctivae normal.      Pupils: Pupils are equal, round, and reactive to light.   Neck:      Vascular: No carotid bruit.   Cardiovascular:      Rate and Rhythm: Regular rhythm. Bradycardia present.      Pulses: Normal pulses.      Heart sounds: Normal heart sounds.   Pulmonary:      Effort: No respiratory distress.      Breath sounds: Normal breath sounds. No wheezing.   Abdominal:      General: Bowel sounds are normal. There is no distension.      Palpations: Abdomen is soft.      Tenderness: There is no abdominal tenderness. There is no guarding or rebound.   Musculoskeletal:         General: Normal range of motion.      Cervical back: Neck supple. No tenderness.   Skin:     General: Skin is warm and dry.   Neurological:      General: No focal deficit present.      Mental Status: He is alert and oriented to person, place, and time.   Psychiatric:         Mood and Affect: Mood normal.         Behavior: Behavior normal.          Laboratory:  CBC:  Recent Labs   Lab 01/14/21  0930 08/24/22  0843   WBC 4.61 6.54   RBC 5.17 5.19   Hemoglobin 15.3 15.3   Hematocrit 46.5 45.4   Platelets 153 141 L   MCV 90 88   MCH 29.6 29.5   MCHC 32.9 33.7       CMP:  Recent Labs   Lab 01/14/21  0930 03/04/21  0800 08/24/22  0843   Glucose 76  --  94   Calcium 9.2  --  9.1   Albumin 4.5 4.0 4.1   Total Protein 6.9 6.6 6.5   Sodium 143  --  141   Potassium 4.0  --  4.0   CO2 26  --  27   Chloride 103  --  107   BUN 17  --  13   Creatinine 0.8  --  0.8   Alkaline Phosphatase 79 83 77   ALT 22 22 22   AST 21 21 15   Total Bilirubin 1.2 H 0.5 0.9         LIPIDS:  Recent Labs   Lab 01/14/21  0930 08/24/22  0843   TSH 0.558 0.839   HDL 36 L 35 L   Cholesterol 164 169   Triglycerides 109 174 H   LDL Cholesterol 106.2 99.2   HDL/Cholesterol Ratio 22.0 20.7   Non-HDL Cholesterol 128 134    Total Cholesterol/HDL Ratio 4.6 4.8       TSH:  Recent Labs   Lab 01/14/21  0930 08/24/22  0843   TSH 0.558 0.839              Recent Labs   Lab 08/24/22  0843   Hepatitis C Ab Negative       Assessment/Plan     Willy Bueno is a 47 y.o.male with:    Normal physical exam, routine  -     Comprehensive Metabolic Panel; Future; Expected date: 09/08/2023  -     CBC Auto Differential; Future; Expected date: 09/08/2023  -     TSH; Future; Expected date: 09/08/2023  - Performed today.  Will check Basic labs.  RTC in 1 yr for fu or sooner if needed    Mild intermittent asthma without complication  -     albuterol (PROVENTIL/VENTOLIN HFA) 90 mcg/actuation inhaler; Inhale 2 puffs into the lungs every 6 (six) hours as needed for Wheezing or Shortness of Breath. Rescue  Dispense: 18 g; Refill: 3  - Stable.  Cont current regimen.    Multinodular goiter  - Stable. Repeat U/S as above. FNA - benign.     Vitamin B12 deficiency  - Cont B12 suppl.    Screening for lipoid disorders  -     Lipid Panel; Future; Expected date: 09/08/2023    Prostate cancer screening  -     PSA, Screening; Future; Expected date: 09/08/2023    Chronic conditions status updated as per HPI.  Other than changes above, cont current medications and maintain follow up with specialists.    Follow up in about 1 year (around 9/8/2024) for well visit or sooner if needed.      Lizzie Talavera MD  [facility]

## 2023-10-07 ENCOUNTER — PATIENT MESSAGE (OUTPATIENT)
Dept: PRIMARY CARE CLINIC | Facility: CLINIC | Age: 48
End: 2023-10-07
Payer: COMMERCIAL

## 2023-10-10 ENCOUNTER — OFFICE VISIT (OUTPATIENT)
Dept: PRIMARY CARE CLINIC | Facility: CLINIC | Age: 48
End: 2023-10-10
Payer: COMMERCIAL

## 2023-10-10 DIAGNOSIS — B02.29 POSTHERPETIC NEURALGIA: Primary | ICD-10-CM

## 2023-10-10 DIAGNOSIS — J01.90 ACUTE RHINOSINUSITIS: ICD-10-CM

## 2023-10-10 PROBLEM — M79.671 RIGHT FOOT PAIN: Status: RESOLVED | Noted: 2022-08-24 | Resolved: 2023-10-10

## 2023-10-10 PROCEDURE — 99213 OFFICE O/P EST LOW 20 MIN: CPT | Mod: 95,,, | Performed by: INTERNAL MEDICINE

## 2023-10-10 PROCEDURE — 99213 PR OFFICE/OUTPT VISIT, EST, LEVL III, 20-29 MIN: ICD-10-PCS | Mod: 95,,, | Performed by: INTERNAL MEDICINE

## 2023-10-10 RX ORDER — GABAPENTIN 300 MG/1
CAPSULE ORAL
Qty: 90 CAPSULE | Refills: 1 | Status: SHIPPED | OUTPATIENT
Start: 2023-10-10 | End: 2024-03-04

## 2023-10-10 RX ORDER — BENZONATATE 200 MG/1
200 CAPSULE ORAL 3 TIMES DAILY PRN
Qty: 30 CAPSULE | Refills: 0 | Status: SHIPPED | OUTPATIENT
Start: 2023-10-10 | End: 2023-10-20

## 2023-10-10 NOTE — PROGRESS NOTES
"[facility] Note    Chief Complaint    No chief complaint on file.      History of Present Illness      Willy Bueno is a 48 y.o.   WM with Psoriasis, Hypertriglyceridemia, Nicotine dependence in remission presents to fu same day visit for Painful rash to Rt torso x 2 wks and URI Sx's x 1 wk with improvement starting yeterday. Referred by wife.  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  Last visit - 9/8/23    The patient location is: "home"  The chief complaint leading to consultation is: "Painful rash to Rt torso x 2 wks and URI Sx's x 1 wk with improvement starting yeterday".     Visit type: audiovisual    Face to Face time with patient: 13 min  13 minutes of total time spent on the encounter, which includes face to face time and non-face to face time preparing to see the patient (eg, review of tests), Obtaining and/or reviewing separately obtained history, Documenting clinical information in the electronic or other health record, Independently interpreting results (not separately reported) and communicating results to the patient/family/caregiver, or Care coordination (not separately reported).         Each patient to whom he or she provides medical services by telemedicine is:  (1) informed of the relationship between the physician and patient and the respective role of any other health care provider with respect to management of the patient; and (2) notified that he or she may decline to receive medical services by telemedicine and may withdraw from such care at any time.    Notes:     Postherpetic neuralgia - Painful rash x 2 wks to RT chest around RT nipple/rib cage - It erupted the day after he saw his Derm. He sent me a pic of the rash which does not look typical of shingles but it has been 2 wks and does not cross midline. Sx's also seem c/w Neuropathic pain. "It feels like a mix of freezing, burning, itching and tingling every time something touches it like a seat belt."  Rec Gabapentin for " pain. 2/10 at rest and 4/10 in severity typically with movement  when something touches the area it can be 7/10    URI - 6 yo Daughter +RSV 2 wks ago. Pt has been feeling ill x 1 wk. Started feeling better yest. Cough prod foul tasting green sputum.  +postnasal drip.  Other daughter with diarrheal illness.Tm 99.5 last wk.  Pt denies any N/V, D/C, BA. He will alert me if Sx's worsen.    HCM - Flu - 10/17/22;  Tdap - 10/17/18; PCV 20 - none- defers; Shingrix - due at 52 y.o.;  COVID -19 -Vaccine 1/12/21; # 2 2/3/21; # 3 12/27/21; #4 12/27/22;  Hep C Screen - 8/4/22 - neg.; HIV Screen - utd - neg.; C-scope - 2/10/23- polyps - repeat 3 yrs;  PSA - ; Prev PCP - Dr. Elaine;  - Dr. Campbell; Ortho - Dr. Suárez; Derm - Dr. Prado; Ophtho -plans to see Dr. Jones; GI - Dr. Flowers/Dr. Goodson;  - Dr. Campbell; Well visit - 9/8/23    Patient Care Team:  Lizzie Talavera MD as PCP - General (Internal Medicine)  Lisette Altamirano MA as Care Coordinator     Health Maintenance:  Immunization History   Administered Date(s) Administered    COVID-19, MRNA, LN-S, PF (Pfizer) (Purple Cap) 01/12/2021, 02/03/2021, 12/27/2021    COVID-19, mRNA, LNP-S, bivalent booster, PF (PFIZER OMICRON) 12/27/2022    Influenza (FLUBLOK) - Quadrivalent - Recombinant - PF *Preferred* (egg allergy) 10/01/2020    Influenza - Quadrivalent 10/10/2019    Influenza - Quadrivalent - PF *Preferred* (6 months and older) 02/11/2019, 10/10/2019, 10/08/2021, 10/17/2022    Tdap 10/17/2018      Health Maintenance   Topic Date Due    Colorectal Cancer Screening  02/10/2026    Lipid Panel  08/24/2027    TETANUS VACCINE  10/17/2028    Hepatitis C Screening  Completed        Past Medical History:  Past Medical History:   Diagnosis Date    COVID-19     9/2021; 7/25/22    Dislocated shoulder, left, sequela     x 2     Dislocated shoulder, right, sequela     Jaw fracture     Leg fracture     in childhood     Psoriasis     Supraspinatus tendon tear     left     Wrist fracture, closed        Past Surgical History:   has a past surgical history that includes Lipoma resection () and Mole removal.    Family History:  family history includes Alzheimer's disease in his maternal grandmother; Alzheimer's disease (age of onset: 53) in his maternal aunt; Congenital heart disease in his father. He was adopted.     Social History:  Social History     Tobacco Use    Smoking status: Former     Current packs/day: 0.00     Average packs/day: 1 pack/day for 22.0 years (22.0 ttl pk-yrs)     Types: Cigarettes     Start date:      Quit date: 2018     Years since quittin.7    Smokeless tobacco: Never   Substance Use Topics    Alcohol use: Yes     Alcohol/week: 1.0 standard drink of alcohol     Types: 1 Cans of beer per week     Comment: rare    Drug use: Never       Review of Systems   Constitutional:  Positive for fever. Negative for chills and diaphoresis.        Tm - 99.5 a 1 wk ago.    HENT:  Positive for postnasal drip and sinus pressure/congestion.         Sinu pressure first 5 days acc by ulysses case pain and ear pain - has since resolved    Respiratory:  Positive for cough.    Gastrointestinal:  Negative for abdominal pain, constipation, diarrhea, nausea and vomiting.   Musculoskeletal:  Negative for arthralgias and myalgias.   Neurological:  Negative for dizziness and headaches.        Medications:    Current Outpatient Medications:     albuterol (PROVENTIL/VENTOLIN HFA) 90 mcg/actuation inhaler, Inhale 2 puffs into the lungs every 6 (six) hours as needed for Wheezing or Shortness of Breath. Rescue, Disp: 18 g, Rfl: 3    benzonatate (TESSALON) 200 MG capsule, Take 1 capsule (200 mg total) by mouth 3 (three) times daily as needed for Cough., Disp: 30 capsule, Rfl: 0    calcium carbonate (TUMS) 200 mg calcium (500 mg) chewable tablet, Take 1 tablet by mouth as needed., Disp: , Rfl:     calcium carbonate/vitamin D3 (VITAMIN D-3 ORAL), Take 1 capsule by mouth every Mon, Wed,  Fri., Disp: , Rfl:     cyanocobalamin, vitamin B-12, (VITAMIN B-12 ORAL), Take 5,000 mcg by mouth once daily., Disp: , Rfl:     famotidine (PEPCID) 20 MG tablet, Take 20 mg by mouth daily as needed for Heartburn., Disp: , Rfl:     gabapentin (NEURONTIN) 300 MG capsule, 1 po QHS and can inc to BID in 3 days and TID 3 days, Disp: 90 capsule, Rfl: 1    montelukast (SINGULAIR) 10 mg tablet, Take 1 tablet (10 mg total) by mouth every evening., Disp: 30 tablet, Rfl: 5    multivitamin (THERAGRAN) per tablet, Take 1 tablet by mouth once daily., Disp: , Rfl:     triamcinolone acetonide 0.1% (KENALOG) 0.1 % ointment, Apply topically. Uses prn. Unsure of dosage, Disp: , Rfl:      Allergies:  Review of patient's allergies indicates:   Allergen Reactions    Bactrim [sulfamethoxazole-trimethoprim] Blisters    Doxycycline Nausea Only       Physical Exam                    Physical Exam  Constitutional:       General: He is not in acute distress.     Appearance: Normal appearance. He is not ill-appearing, toxic-appearing or diaphoretic.   HENT:      Head: Normocephalic and atraumatic.   Pulmonary:      Effort: Pulmonary effort is normal. No respiratory distress.   Skin:     Comments: See pics sent by pt earlier today under Media tab   Neurological:      General: No focal deficit present.      Mental Status: He is alert and oriented to person, place, and time.   Psychiatric:         Mood and Affect: Mood normal.         Behavior: Behavior normal.          Laboratory:  CBC:  Recent Labs   Lab 01/14/21  0930 08/24/22  0843   WBC 4.61 6.54   RBC 5.17 5.19   Hemoglobin 15.3 15.3   Hematocrit 46.5 45.4   Platelets 153 141 L   MCV 90 88   MCH 29.6 29.5   MCHC 32.9 33.7       CMP:  Recent Labs   Lab 01/14/21  0930 03/04/21  0800 08/24/22  0843   Glucose 76  --  94   Calcium 9.2  --  9.1   Albumin 4.5 4.0 4.1   Total Protein 6.9 6.6 6.5   Sodium 143  --  141   Potassium 4.0  --  4.0   CO2 26  --  27   Chloride 103  --  107   BUN 17  --  13    Creatinine 0.8  --  0.8   Alkaline Phosphatase 79 83 77   ALT 22 22 22   AST 21 21 15   Total Bilirubin 1.2 H 0.5 0.9          LIPIDS:  Recent Labs   Lab 01/14/21  0930 08/24/22  0843   TSH 0.558 0.839   HDL 36 L 35 L   Cholesterol 164 169   Triglycerides 109 174 H   LDL Cholesterol 106.2 99.2   HDL/Cholesterol Ratio 22.0 20.7   Non-HDL Cholesterol 128 134   Total Cholesterol/HDL Ratio 4.6 4.8       TSH:  Recent Labs   Lab 01/14/21  0930 08/24/22  0843   TSH 0.558 0.839              Recent Labs   Lab 08/24/22  0843   Hepatitis C Ab Negative       Assessment/Plan     Willy Bueno is a 48 y.o.male with:    Postherpetic neuralgia  -     gabapentin (NEURONTIN) 300 MG capsule; 1 po QHS and can inc to BID in 3 days and TID 3 days  Dispense: 90 capsule; Refill: 1  - will Rx gabapentin. D/w pt some potential S.e. Alert MD for any concerns.     Acute rhinosinusitis  -     benzonatate (TESSALON) 200 MG capsule; Take 1 capsule (200 mg total) by mouth 3 (three) times daily as needed for Cough.  Dispense: 30 capsule; Refill: 0  - Improving. Cont supportive Care. Pt feeling better today.  He does not feel he needs Abx at this time. I agree since feeling much better today. Will refill his tessalon perles which help.      Chronic conditions status updated as per HPI.  Other than changes above, cont current medications and maintain follow up with specialists.  Follow up in about 11 months (around 9/9/2024) for well visit or sooner if needed.      Lizzie Talavera MD  [facility]                  Answers submitted by the patient for this visit:  Rash Questionnaire (Submitted on 10/10/2023)  Chief Complaint: Rash  Chronicity: new  Onset: 1 to 4 weeks ago  Progression since onset: gradually worsening  Affected locations: chest, torso  Characteristics: burning, pain, redness, itchiness  Exposed to: a new detergent/soap, an ill contact, a new medication  anorexia: No  facial edema: No  joint pain: No  nail changes:  No  Treatments tried: nothing  Improvement on treatment: no relief  asthma: Yes  allergies: Yes  eczema: No  varicella: Yes

## 2023-10-10 NOTE — TELEPHONE ENCOUNTER
Can we add him at 11 AM -12 as a virtual. Agree rash not typical of shingles but it has been 2 wks. Could consider Gabapentin for pain. Can also discuss his ELVIA Sykes's  Dr. HUNT

## 2023-12-21 ENCOUNTER — PATIENT MESSAGE (OUTPATIENT)
Dept: PRIMARY CARE CLINIC | Facility: CLINIC | Age: 48
End: 2023-12-21

## 2023-12-23 LAB
ALBUMIN SERPL-MCNC: 4.5 G/DL (ref 3.6–5.1)
ALBUMIN/GLOB SERPL: 2 (CALC) (ref 1–2.5)
ALP SERPL-CCNC: 63 U/L (ref 36–130)
ALT SERPL-CCNC: 19 U/L (ref 9–46)
AST SERPL-CCNC: 17 U/L (ref 10–40)
BASOPHILS # BLD AUTO: 59 CELLS/UL (ref 0–200)
BASOPHILS NFR BLD AUTO: 0.9 %
BILIRUB SERPL-MCNC: 0.9 MG/DL (ref 0.2–1.2)
BUN SERPL-MCNC: 18 MG/DL (ref 7–25)
BUN/CREAT SERPL: NORMAL (CALC) (ref 6–22)
CALCIUM SERPL-MCNC: 9.5 MG/DL (ref 8.6–10.3)
CHLORIDE SERPL-SCNC: 104 MMOL/L (ref 98–110)
CHOLEST SERPL-MCNC: 168 MG/DL
CHOLEST/HDLC SERPL: 3.8 (CALC)
CO2 SERPL-SCNC: 29 MMOL/L (ref 20–32)
CREAT SERPL-MCNC: 0.85 MG/DL (ref 0.6–1.29)
EGFR: 109 ML/MIN/1.73M2
EOSINOPHIL # BLD AUTO: 337 CELLS/UL (ref 15–500)
EOSINOPHIL NFR BLD AUTO: 5.1 %
ERYTHROCYTE [DISTWIDTH] IN BLOOD BY AUTOMATED COUNT: 12.4 % (ref 11–15)
GLOBULIN SER CALC-MCNC: 2.2 G/DL (CALC) (ref 1.9–3.7)
GLUCOSE SERPL-MCNC: 93 MG/DL (ref 65–99)
HCT VFR BLD AUTO: 46.8 % (ref 38.5–50)
HDLC SERPL-MCNC: 44 MG/DL
HGB BLD-MCNC: 15.7 G/DL (ref 13.2–17.1)
LDLC SERPL CALC-MCNC: 97 MG/DL (CALC)
LYMPHOCYTES # BLD AUTO: 2171 CELLS/UL (ref 850–3900)
LYMPHOCYTES NFR BLD AUTO: 32.9 %
MCH RBC QN AUTO: 29.3 PG (ref 27–33)
MCHC RBC AUTO-ENTMCNC: 33.5 G/DL (ref 32–36)
MCV RBC AUTO: 87.5 FL (ref 80–100)
MONOCYTES # BLD AUTO: 495 CELLS/UL (ref 200–950)
MONOCYTES NFR BLD AUTO: 7.5 %
NEUTROPHILS # BLD AUTO: 3538 CELLS/UL (ref 1500–7800)
NEUTROPHILS NFR BLD AUTO: 53.6 %
NONHDLC SERPL-MCNC: 124 MG/DL (CALC)
PLATELET # BLD AUTO: 195 THOUSAND/UL (ref 140–400)
PMV BLD REES-ECKER: 10.7 FL (ref 7.5–12.5)
POTASSIUM SERPL-SCNC: 4.3 MMOL/L (ref 3.5–5.3)
PROT SERPL-MCNC: 6.7 G/DL (ref 6.1–8.1)
PSA SERPL-MCNC: 1.97 NG/ML
RBC # BLD AUTO: 5.35 MILLION/UL (ref 4.2–5.8)
SODIUM SERPL-SCNC: 141 MMOL/L (ref 135–146)
TRIGL SERPL-MCNC: 172 MG/DL
TSH SERPL-ACNC: 0.45 MIU/L (ref 0.4–4.5)
WBC # BLD AUTO: 6.6 THOUSAND/UL (ref 3.8–10.8)

## 2023-12-26 NOTE — PROGRESS NOTES
I sent pt a my chart message -  I reviewed your  labs. Your Prostate specific antigen was normal at 1.97.  Your thyroid function was low normal.  We will continue to monitor this.  Your Cholesterol looked good in that your LDL (bad cholesterol) was low. Your Triglycerides were mildly elevated.  I do not worry about this number unless it is much higher. You do not require medication for this at this time. Lifestyle modification with diet and exercise can improve this number.  Your kidney function and liver functions looked good.  You are not anemic. No further recommendations at this time.  Have a Happy new Year!    Dr. HUNT

## 2024-01-03 ENCOUNTER — PATIENT MESSAGE (OUTPATIENT)
Dept: UROLOGY | Facility: CLINIC | Age: 49
End: 2024-01-03

## 2024-01-05 ENCOUNTER — PATIENT MESSAGE (OUTPATIENT)
Dept: PRIMARY CARE CLINIC | Facility: CLINIC | Age: 49
End: 2024-01-05

## 2024-01-15 PROBLEM — J01.90 ACUTE RHINOSINUSITIS: Status: RESOLVED | Noted: 2023-10-10 | Resolved: 2024-01-15

## 2024-02-05 ENCOUNTER — HOSPITAL ENCOUNTER (OUTPATIENT)
Dept: RADIOLOGY | Facility: HOSPITAL | Age: 49
Discharge: HOME | End: 2024-02-05
Attending: STUDENT IN AN ORGANIZED HEALTH CARE EDUCATION/TRAINING PROGRAM
Payer: COMMERCIAL

## 2024-02-05 ENCOUNTER — OFFICE VISIT (OUTPATIENT)
Dept: UROLOGY | Facility: CLINIC | Age: 49
End: 2024-02-05
Payer: COMMERCIAL

## 2024-02-05 VITALS
DIASTOLIC BLOOD PRESSURE: 76 MMHG | WEIGHT: 180 LBS | SYSTOLIC BLOOD PRESSURE: 129 MMHG | HEIGHT: 68 IN | HEART RATE: 59 BPM | BODY MASS INDEX: 27.28 KG/M2

## 2024-02-05 DIAGNOSIS — R10.9 FLANK PAIN: ICD-10-CM

## 2024-02-05 DIAGNOSIS — R10.9 FLANK PAIN: Primary | ICD-10-CM

## 2024-02-05 PROCEDURE — 99999 PR PBB SHADOW E&M-EST. PATIENT-LVL III: CPT | Mod: PBBFAC,,, | Performed by: UROLOGY

## 2024-02-05 PROCEDURE — 74018 RADEX ABDOMEN 1 VIEW: CPT | Mod: TC

## 2024-02-05 PROCEDURE — 1160F RVW MEDS BY RX/DR IN RCRD: CPT | Mod: CPTII,S$GLB,, | Performed by: UROLOGY

## 2024-02-05 PROCEDURE — 1159F MED LIST DOCD IN RCRD: CPT | Mod: CPTII,S$GLB,, | Performed by: UROLOGY

## 2024-02-05 PROCEDURE — 3074F SYST BP LT 130 MM HG: CPT | Mod: CPTII,S$GLB,, | Performed by: UROLOGY

## 2024-02-05 PROCEDURE — 3078F DIAST BP <80 MM HG: CPT | Mod: CPTII,S$GLB,, | Performed by: UROLOGY

## 2024-02-05 PROCEDURE — 99214 OFFICE O/P EST MOD 30 MIN: CPT | Mod: S$GLB,,, | Performed by: UROLOGY

## 2024-02-05 PROCEDURE — 3008F BODY MASS INDEX DOCD: CPT | Mod: CPTII,S$GLB,, | Performed by: UROLOGY

## 2024-02-05 PROCEDURE — 74018 RADEX ABDOMEN 1 VIEW: CPT | Mod: 26,,, | Performed by: RADIOLOGY

## 2024-02-05 NOTE — PROGRESS NOTES
"[facility] [address]   Clinic Note    SUBJECTIVE:     Chief Complaint: bilateral flank pain    History of Present Illness:  Willy Bueno is a 48 y.o. male who presents to clinic for flank pain.     Pain started in the middle of November. Bilateral flank discomfort for 10 mins at a time.   C/o urgency, frequency, incomplete emptying after this initial pain episode for the period of 24 hours.  Denies hematuria or dysuria.  These spells last for 24 hours and then go away for a few weeks.     No previous history of stones. No history of UTI.    UA neg today. Emptying the bladder well      OBJECTIVE:     Estimated body mass index is 27.37 kg/m² as calculated from the following:    Height as of this encounter: 5' 8" (1.727 m).    Weight as of this encounter: 81.6 kg (180 lb).    Vital Signs (Most Recent)  Pulse: (!) 59 (02/05/24 1008)  BP: 129/76 (02/05/24 1008)    Physical Exam  Constitutional:       Appearance: Normal appearance.   HENT:      Head: Normocephalic.   Eyes:      Pupils: Pupils are equal, round, and reactive to light.   Cardiovascular:      Rate and Rhythm: Normal rate.   Pulmonary:      Effort: Pulmonary effort is normal.   Chest:      Chest wall: No tenderness.   Abdominal:      General: Abdomen is flat. There is no distension.      Palpations: Abdomen is soft.      Tenderness: There is no abdominal tenderness. There is no right CVA tenderness or left CVA tenderness.   Musculoskeletal:         General: Normal range of motion.      Cervical back: Normal range of motion.   Skin:     General: Skin is warm.   Neurological:      General: No focal deficit present.      Mental Status: He is alert and oriented to person, place, and time.   Psychiatric:         Mood and Affect: Mood normal.         Behavior: Behavior normal.       Lab Results   Component Value Date    BUN 18 12/22/2023    CREATININE 0.85 12/22/2023    WBC 6.6 12/22/2023    HGB 15.7 12/22/2023    HCT 46.8 12/22/2023     " 12/22/2023    AST 17 12/22/2023    ALT 19 12/22/2023    ALKPHOS 77 08/24/2022    ALBUMIN 4.5 12/22/2023        Lab Results   Component Value Date    PSADIAG 1.97 12/22/2023         ASSESSMENT     1. Flank pain      PLAN:   1. Flank pain    -Renal US and KUB today to rule out stones  -Discussed relaxation techniques during his urinary episodes. Possible relation to bladder spasms. Symptom onset correlates when his child was born. Stress component involved.    Burt Martel MD

## 2024-02-08 ENCOUNTER — HOSPITAL ENCOUNTER (OUTPATIENT)
Dept: RADIOLOGY | Facility: HOSPITAL | Age: 49
Discharge: HOME | End: 2024-02-08
Attending: STUDENT IN AN ORGANIZED HEALTH CARE EDUCATION/TRAINING PROGRAM
Payer: COMMERCIAL

## 2024-02-08 DIAGNOSIS — R10.9 FLANK PAIN: ICD-10-CM

## 2024-02-08 PROCEDURE — 76775 US EXAM ABDO BACK WALL LIM: CPT | Mod: TC

## 2024-02-08 PROCEDURE — 76775 US EXAM ABDO BACK WALL LIM: CPT | Mod: 26,,, | Performed by: RADIOLOGY

## 2024-02-19 ENCOUNTER — TELEPHONE (OUTPATIENT)
Dept: UROLOGY | Facility: CLINIC | Age: 49
End: 2024-02-19
Payer: COMMERCIAL

## 2024-03-04 ENCOUNTER — OFFICE VISIT (OUTPATIENT)
Dept: PRIMARY CARE CLINIC | Facility: CLINIC | Age: 49
End: 2024-03-04
Payer: COMMERCIAL

## 2024-03-04 VITALS
HEART RATE: 56 BPM | TEMPERATURE: 98 F | SYSTOLIC BLOOD PRESSURE: 118 MMHG | OXYGEN SATURATION: 96 % | BODY MASS INDEX: 28.7 KG/M2 | RESPIRATION RATE: 18 BRPM | HEIGHT: 68 IN | WEIGHT: 189.38 LBS | DIASTOLIC BLOOD PRESSURE: 80 MMHG

## 2024-03-04 DIAGNOSIS — D18.03 HEPATIC HEMANGIOMA: Primary | ICD-10-CM

## 2024-03-04 DIAGNOSIS — E04.2 MULTINODULAR GOITER: ICD-10-CM

## 2024-03-04 DIAGNOSIS — J36 PERITONSILLAR ABSCESS: ICD-10-CM

## 2024-03-04 DIAGNOSIS — R93.1 ABNORMAL ECHOCARDIOGRAM: ICD-10-CM

## 2024-03-04 DIAGNOSIS — J45.20 MILD INTERMITTENT ASTHMA WITHOUT COMPLICATION: ICD-10-CM

## 2024-03-04 PROCEDURE — 99999 PR PBB SHADOW E&M-EST. PATIENT-LVL V: CPT | Mod: PBBFAC,,, | Performed by: INTERNAL MEDICINE

## 2024-03-04 PROCEDURE — 99214 OFFICE O/P EST MOD 30 MIN: CPT | Mod: S$GLB,,, | Performed by: INTERNAL MEDICINE

## 2024-03-04 PROCEDURE — 3008F BODY MASS INDEX DOCD: CPT | Mod: CPTII,S$GLB,, | Performed by: INTERNAL MEDICINE

## 2024-03-04 PROCEDURE — 3079F DIAST BP 80-89 MM HG: CPT | Mod: CPTII,S$GLB,, | Performed by: INTERNAL MEDICINE

## 2024-03-04 PROCEDURE — 1159F MED LIST DOCD IN RCRD: CPT | Mod: CPTII,S$GLB,, | Performed by: INTERNAL MEDICINE

## 2024-03-04 PROCEDURE — 3074F SYST BP LT 130 MM HG: CPT | Mod: CPTII,S$GLB,, | Performed by: INTERNAL MEDICINE

## 2024-03-04 PROCEDURE — 1160F RVW MEDS BY RX/DR IN RCRD: CPT | Mod: CPTII,S$GLB,, | Performed by: INTERNAL MEDICINE

## 2024-03-04 RX ORDER — MINOXIDIL 2.5 MG/1
2.5 TABLET ORAL DAILY
COMMUNITY
Start: 2023-10-05

## 2024-03-04 RX ORDER — CLINDAMYCIN HYDROCHLORIDE 300 MG/1
300 CAPSULE ORAL 4 TIMES DAILY
COMMUNITY
Start: 2024-02-24 | End: 2024-05-01 | Stop reason: ALTCHOICE

## 2024-03-04 NOTE — PROGRESS NOTES
[facility] Note    Chief Complaint      Chief Complaint   Patient presents with    Follow-up       History of Present Illness      Willy Bueno is a 48 y.o. WM with Psoriasis, Hypertriglyceridemia, Nicotine dependence in remission presents to recent issues. Referred by wife. 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  Last  virtual visit - 10/10/23    Peritonsillar abscess- He is on D # 8/10 course of Clinda. Feels fine now but reports an abscess he noted that popped and felt purulent exudate expressed after a forceful cough.  Felt a pressure and discomfort to his Rt post. Oropharynx prior to this. No Issues with breathing or swallowing. No change in voice. No F/C.  No NS. He is on a probiotic.  No D/C.     Lipomatous hypertrophy of the interatrial septum  -Echo - 5/19/23 - Normal EF -65%. Trace MR, mild Tricuspid regurgitation.There appears to be lipomatous hypertrophy of the interatrial septum.   Sinuses of Valsalva mildly dilated. Planning to repeat Echo - 1 yr. Fu with Dr. Hicks - MAy. CTA chest - 5/23/23 - no evidence of a pulmonary embolism. Thoracic aorta normal caliber. Trace amount of scattered interstitial thickening Incidentally it also showed a Mildly enlarged left thyroid lobe with substernal extension. Splenomegaly measuring 14.3 cm. CTA CAP - 5/31/23 - No findings to suggest aortic aneurysm or dissection.No pulmonary thromboembolism. Right hepatic lobe lesion suggests hemangioma, smaller lesion within the hepatic dome may reflect flash filling hemangioma although incompletely characterized.     Thyroid nodule - Thyroid U/S - 5/24/23 - 2.7 cm and rec FNA Fu with Dr. Monique. FNA - 6/5/23 - benign. Plan follow up in 1 year with TSH and thyroid u/s prior.      Vit B12 def - B12 386 - 5/2/23. Cont. Vit B12 1000 mcg/d.     Vit D def - Vit D 24. Cont.  Vit D3 1000 u/d.      HTG -Improved 172.  It has been as high as 400 in past.      ADD - d/x ADD in 1997 or 1998. He was on medication in college  but stopped taking it around 2003. Rec formal testing so can Rx pharmacotherapy if needed in future.      Psoriasis - Pt on topicals per Dr. Nathaniel Hernandez     GERD - EGD - Grade I esophagitis, Gastritis, Duodenitis.  Rec reflux prec.Takes Pepcid prn.      Asthma - SOB much better.  +h/o Exercise induced asthma in teens. He reports worsening of Sx's after recently cutting the grass last wk. ?allergic triggers. Uses albuterol prn. Singulair has helped.   Rec consider PCV 20.      Colon Polyps - repeat 2/10/23 -  C-scope 3 yrs.      Fam h/o Bicuspid aortic valve and aneurysm - No evid. of this on recent Echo for pt.     H/o Flank pain - Fu with Dr Merissa ROGERS. Renal U/S - Stable 6.1 cm Right hepatic heterogeneous mass favored to represent a hemangioma.   KUB - 2/5/24 - Moderate amount of fecal matter scattered through the colon. Pain was felt to be due to possible bladder spasms.     H/o Postherpetic neuralgia - Resolved.      HCM - Flu - 10/17/22;  Tdap - 10/17/18; PCV 20 - none- defers; Shingrix - due at 52 y.o.;  COVID -19 -Vaccine 1/12/21; # 2 2/3/21; # 3 12/27/21; #4 12/27/22;  Hep C Screen - 8/4/22 - neg.; HIV Screen - utd - neg.; C-scope - 2/10/23- polyps - repeat 3 yrs;  PSA - ; Prev PCP - Dr. Elaine;  - Dr. Campbell; Ortho - Dr. Suárez; Derm - Dr. Prado; Ophtho -plans to see Dr. Jones; GI - Dr. Flowers/Dr. Goodson; KEN - Dr. Campbell; Well visit - 9/8/23    Patient Care Team:  Lizzie Talavera MD as PCP - General (Internal Medicine)  Lisette Altamirano MA as Care Coordinator     Health Maintenance:  Immunization History   Administered Date(s) Administered    COVID-19 MRNA, LN-S PF (MODERNA HALF 0.25 ML DOSE) 12/27/2021    COVID-19, MRNA, LN-S, PF (Pfizer) (Purple Cap) 01/12/2021, 02/03/2021, 12/27/2021    COVID-19, mRNA, LNP-S, PF, bhumika-sucrose, 30 mcg/0.3 mL (Pfizer 2023 Ages 12+) 01/04/2024    COVID-19, mRNA, LNP-S, bivalent booster, PF (PFIZER OMICRON) 12/27/2022    Influenza (FLUBLOK) -  Quadrivalent - Recombinant - PF *Preferred* (egg allergy) 10/01/2020    Influenza - Quadrivalent 10/10/2019    Influenza - Quadrivalent - MDCK - PF 2023    Influenza - Quadrivalent - PF *Preferred* (6 months and older) 2019, 10/10/2019, 10/08/2021, 10/17/2022    Tdap 10/17/2018      Health Maintenance   Topic Date Due    Colorectal Cancer Screening  02/10/2026    TETANUS VACCINE  10/17/2028    Lipid Panel  2028    Hepatitis C Screening  Completed        Past Medical History:  Past Medical History:   Diagnosis Date    COVID-19     2021; 22    Dislocated shoulder, left, sequela     x 2     Dislocated shoulder, right, sequela     Jaw fracture     Leg fracture     in childhood     Psoriasis     Right foot pain 2022    Supraspinatus tendon tear     left    Wrist fracture, closed        Past Surgical History:   has a past surgical history that includes Lipoma resection () and Mole removal.    Family History:  family history includes Alzheimer's disease in his maternal grandmother; Alzheimer's disease (age of onset: 53) in his maternal aunt; Congenital heart disease in his father; Depression in his mother; Early death in his maternal aunt; Heart disease in his father. He was adopted.     Social History:  Social History     Tobacco Use    Smoking status: Former     Current packs/day: 0.00     Average packs/day: 1 pack/day for 22.0 years (22.0 ttl pk-yrs)     Types: Cigarettes     Start date:      Quit date: 2018     Years since quittin.1    Smokeless tobacco: Never   Substance Use Topics    Alcohol use: Yes     Alcohol/week: 1.0 standard drink of alcohol     Types: 1 Cans of beer per week     Comment: rare    Drug use: Never       Review of Systems   Constitutional:  Negative for chills, diaphoresis and fever.   Respiratory:  Negative for cough and shortness of breath.    Cardiovascular:  Positive for chest pain. Negative for palpitations.        Rt chest discomfort/soreness 2 days  ago. 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  No exac factor. Slowly resolved over 30-45 min. He was wearing his fit bit -nl readings. No irregular rhythm. No palpitations.    Gastrointestinal:  Negative for abdominal pain, constipation and diarrhea.   Endocrine: Negative for cold intolerance, heat intolerance and polydipsia.   Musculoskeletal:  Negative for arthralgias, leg pain and myalgias.   Neurological:  Negative for dizziness and headaches.        Medications:    Current Outpatient Medications:     albuterol (PROVENTIL/VENTOLIN HFA) 90 mcg/actuation inhaler, Inhale 2 puffs into the lungs every 6 (six) hours as needed for Wheezing or Shortness of Breath. Rescue, Disp: 18 g, Rfl: 3    calcium carbonate/vitamin D3 (VITAMIN D-3 ORAL), Take 1 capsule by mouth every Mon, Wed, Fri., Disp: , Rfl:     clindamycin (CLEOCIN) 300 MG capsule, Take 300 mg by mouth 4 (four) times daily., Disp: , Rfl:     cyanocobalamin, vitamin B-12, (VITAMIN B-12 ORAL), Take 5,000 mcg by mouth once daily., Disp: , Rfl:     famotidine (PEPCID) 20 MG tablet, Take 20 mg by mouth daily as needed for Heartburn., Disp: , Rfl:     minoxidiL (LONITEN) 2.5 MG tablet, Take 2.5 mg by mouth once daily., Disp: , Rfl:     montelukast (SINGULAIR) 10 mg tablet, Take 1 tablet (10 mg total) by mouth every evening., Disp: 30 tablet, Rfl: 5    multivitamin (THERAGRAN) per tablet, Take 1 tablet by mouth once daily., Disp: , Rfl:     triamcinolone acetonide 0.1% (KENALOG) 0.1 % ointment, Apply topically. Uses prn. Unsure of dosage, Disp: , Rfl:     calcium carbonate (TUMS) 200 mg calcium (500 mg) chewable tablet, Take 1 tablet by mouth as needed., Disp: , Rfl:      Allergies:  Review of patient's allergies indicates:   Allergen Reactions    Bactrim [sulfamethoxazole-trimethoprim] Blisters    Doxycycline Nausea Only       Physical Exam      Vital Signs  Temp: 98 °F (36.7 °C)  Temp Source: Oral  Pulse: (!) 56  Resp: 18  SpO2: 96  "%  BP: 118/80  BP Location: Left arm  Patient Position: Sitting  Pain Score: 0-No pain  Height and Weight  Height: 5' 8" (172.7 cm)  Weight: 85.9 kg (189 lb 6 oz)  BSA (Calculated - sq m): 2.03 sq meters  BMI (Calculated): 28.8  Weight in (lb) to have BMI = 25: 164.1      Patient Position: Sitting      Physical Exam  Vitals reviewed.   Constitutional:       General: He is not in acute distress.     Appearance: Normal appearance. He is not ill-appearing, toxic-appearing or diaphoretic.   HENT:      Head: Normocephalic and atraumatic.      Right Ear: Tympanic membrane normal.      Left Ear: Tympanic membrane normal.      Mouth/Throat:      Mouth: Mucous membranes are moist.      Pharynx: Oropharynx is clear. No oropharyngeal exudate or posterior oropharyngeal erythema.      Comments: No exudate or tonsillar enlargement or fullness noted  Neck:      Vascular: No carotid bruit.      Comments: No fluctuance, no palpable masses.  Cardiovascular:      Rate and Rhythm: Normal rate and regular rhythm.      Pulses: Normal pulses.      Heart sounds: Normal heart sounds. No murmur heard.  Pulmonary:      Effort: No respiratory distress.      Breath sounds: Normal breath sounds. No wheezing.   Abdominal:      General: Bowel sounds are normal. There is no distension.      Palpations: Abdomen is soft.      Tenderness: There is no abdominal tenderness. There is no right CVA tenderness, left CVA tenderness, guarding or rebound.   Musculoskeletal:      Cervical back: Normal range of motion.      Comments: FROM of neck   Lymphadenopathy:      Cervical: No cervical adenopathy.   Skin:     General: Skin is warm and dry.   Neurological:      General: No focal deficit present.      Mental Status: He is alert and oriented to person, place, and time.          Laboratory:  CBC:  Recent Labs   Lab 08/24/22  0843 12/22/23  0849   WBC 6.54 6.6   RBC 5.19 5.35   Hemoglobin 15.3 15.7   Hematocrit 45.4 46.8   Platelets 141 L 195   MCV 88 87.5 "   MCH 29.5 29.3   MCHC 33.7 33.5       CMP:  Recent Labs   Lab 08/24/22  0843 12/22/23  0849   Glucose 94 93   Calcium 9.1 9.5   Albumin 4.1 4.5   Total Protein 6.5 6.7   Sodium 141 141   Potassium 4.0 4.3   CO2 27 29   Chloride 107 104   BUN 13 18   Creatinine 0.8 0.85   Alkaline Phosphatase 77  --    ALT 22 19   AST 15 17   Total Bilirubin 0.9 0.9          LIPIDS:  Recent Labs   Lab 08/24/22  0843 12/22/23  0849   TSH 0.839 0.45   HDL 35 L 44   Cholesterol 169 168   Triglycerides 174 H 172 H   LDL Cholesterol 99.2 97   HDL/Cholesterol Ratio 20.7 3.8   Non-HDL Cholesterol 134  --    Non HDL Chol. (LDL+VLDL)  --  124   Total Cholesterol/HDL Ratio 4.8  --        TSH:  Recent Labs   Lab 08/24/22  0843 12/22/23  0849   TSH 0.839 0.45         Recent Labs   Lab 08/24/22  0843 12/22/23  0849   PSADIAG  --  1.97   Hepatitis C Ab Negative  --        Assessment/Plan     Willy Bueno is a 48 y.o.male with:    Hepatic hemangioma  -     US Abdomen Limited; Future; Expected date: 09/04/2024  - Repeat Abd U/S.  Reassured pt.      Multinodular goiter  -     US Soft Tissue Head Neck; Future; Expected date: 06/06/2024  - Repeat Thyroid U/S.     Peritonsillar abscess  - P.E.  wnl.  Pt will complete his 10 days of Clinda and alert MD for any concerns.     Mild intermittent asthma without complication  - Stable.  Cont current regimen.    Abnormal echocardiogram  - Doing well. Has fu with Cards in May.         Chronic conditions status updated as per HPI.  Other than changes above, cont current medications and maintain follow up with specialists.     Follow up in about 27 weeks (around 9/9/2024) for well visit or sooner if needed.      Lizzie Talavera MD  [facility], [place]

## 2024-03-10 DIAGNOSIS — J00 ACUTE RHINITIS: ICD-10-CM

## 2024-03-10 NOTE — TELEPHONE ENCOUNTER
No care due was identified.  [facility] Embedded Care Due Messages. Reference number: [id number].   3/10/2024 2:07:54 PM CDT

## 2024-03-11 RX ORDER — MONTELUKAST SODIUM 10 MG/1
10 TABLET ORAL NIGHTLY
Qty: 90 TABLET | Refills: 3 | Status: SHIPPED | OUTPATIENT
Start: 2024-03-11

## 2024-03-11 NOTE — TELEPHONE ENCOUNTER
Willy Bueno  is requesting a refill authorization.  Brief Assessment and Rationale for Refill:  Approve     Medication Therapy Plan:         Comments:     Note composed:10:28 AM 03/11/2024

## 2024-03-26 ENCOUNTER — HOSPITAL ENCOUNTER (EMERGENCY)
Facility: HOSPITAL | Age: 49
Discharge: HOME | End: 2024-03-27
Attending: STUDENT IN AN ORGANIZED HEALTH CARE EDUCATION/TRAINING PROGRAM
Payer: COMMERCIAL

## 2024-03-26 DIAGNOSIS — N48.89 PENILE PAIN: Primary | ICD-10-CM

## 2024-03-26 DIAGNOSIS — T14.8XXA BRUISING: ICD-10-CM

## 2024-03-26 LAB
BILIRUB UR QL STRIP: NEGATIVE
CLARITY UR REFRACT.AUTO: CLEAR
COLOR UR AUTO: YELLOW
GLUCOSE UR QL STRIP: NEGATIVE
HGB UR QL STRIP: ABNORMAL
KETONES UR QL STRIP: NEGATIVE
LEUKOCYTE ESTERASE UR QL STRIP: NEGATIVE
NITRITE UR QL STRIP: NEGATIVE
PH UR STRIP: 6 [PH] (ref 5–8)
PROT UR QL STRIP: ABNORMAL
SP GR UR STRIP: 1.02 (ref 1–1.03)
URN SPEC COLLECT METH UR: ABNORMAL

## 2024-03-26 PROCEDURE — 81003 URINALYSIS AUTO W/O SCOPE: CPT | Performed by: STUDENT IN AN ORGANIZED HEALTH CARE EDUCATION/TRAINING PROGRAM

## 2024-03-26 PROCEDURE — 99284 EMERGENCY DEPT VISIT MOD MDM: CPT | Mod: 25

## 2024-03-27 ENCOUNTER — PATIENT MESSAGE (OUTPATIENT)
Dept: PRIMARY CARE CLINIC | Facility: CLINIC | Age: 49
End: 2024-03-27
Payer: COMMERCIAL

## 2024-03-27 VITALS
HEART RATE: 55 BPM | HEIGHT: 68 IN | DIASTOLIC BLOOD PRESSURE: 78 MMHG | SYSTOLIC BLOOD PRESSURE: 110 MMHG | RESPIRATION RATE: 16 BRPM | TEMPERATURE: 98 F | OXYGEN SATURATION: 97 % | BODY MASS INDEX: 28.7 KG/M2 | WEIGHT: 189.38 LBS

## 2024-03-27 DIAGNOSIS — N48.9 PENILE LESION: Primary | ICD-10-CM

## 2024-03-27 NOTE — ED PROVIDER NOTES
Chief Complaint   Groin Pain (Started 30 mins ago, Bleeding at tip of penis)      History Of Present Illness   Willy Bueno is a 48 y.o. male with a PMHx including prior testicular torsion, cardiac mass, thyroid nodule, psoriasis  presenting with penile pain.  Patient states that he had groin pain starting today.  He describes a pain state the pubic area about 1 cm above into the left of the base of the shaft penis.  States it has not tender to palpation you can not palpate any masses or swelling.  Patient also reports that he noticed a blue discoloration to the left half of the glans of his penis.  He reports no penile discharge, penile bleeding.  He reports no testicular pain, or testicular swelling.  He reports no concern for STIs.  He reports no known trauma but states he does have young child declines all over him and he sometimes gets stepped on in the groin.     Independent Historian: Yes  Other Historian or Collateral: Chart review  Interpretor: No      Review of patient's allergies indicates:   Allergen Reactions    Bactrim [sulfamethoxazole-trimethoprim] Blisters    Doxycycline Nausea Only       No current facility-administered medications on file prior to encounter.     Current Outpatient Medications on File Prior to Encounter   Medication Sig Dispense Refill    albuterol (PROVENTIL/VENTOLIN HFA) 90 mcg/actuation inhaler Inhale 2 puffs into the lungs every 6 (six) hours as needed for Wheezing or Shortness of Breath. Rescue 18 g 3    calcium carbonate (TUMS) 200 mg calcium (500 mg) chewable tablet Take 1 tablet by mouth as needed.      calcium carbonate/vitamin D3 (VITAMIN D-3 ORAL) Take 1 capsule by mouth every Mon, Wed, Fri.      clindamycin (CLEOCIN) 300 MG capsule Take 300 mg by mouth 4 (four) times daily.      cyanocobalamin, vitamin B-12, (VITAMIN B-12 ORAL) Take 5,000 mcg by mouth once daily.      famotidine (PEPCID) 20 MG tablet Take 20 mg by mouth daily as needed for Heartburn.       minoxidiL (LONITEN) 2.5 MG tablet Take 2.5 mg by mouth once daily.      montelukast (SINGULAIR) 10 mg tablet TAKE 1 TABLET(10 MG) BY MOUTH EVERY EVENING 90 tablet 3    multivitamin (THERAGRAN) per tablet Take 1 tablet by mouth once daily.      triamcinolone acetonide 0.1% (KENALOG) 0.1 % ointment Apply topically. Uses prn. Unsure of dosage         Past History   As per HPI and below:  Past Medical History:   Diagnosis Date    COVID-19     2021; 22    Dislocated shoulder, left, sequela     x 2     Dislocated shoulder, right, sequela     Jaw fracture     Leg fracture     in childhood     Psoriasis     Right foot pain 2022    Supraspinatus tendon tear     left    Wrist fracture, closed      Past Surgical History:   Procedure Laterality Date    LIPOMA RESECTION      MOLE REMOVAL         Social History     Socioeconomic History    Marital status:    Tobacco Use    Smoking status: Former     Current packs/day: 0.00     Average packs/day: 1 pack/day for 22.0 years (22.0 ttl pk-yrs)     Types: Cigarettes     Start date:      Quit date: 2018     Years since quittin.2    Smokeless tobacco: Never   Substance and Sexual Activity    Alcohol use: Yes     Alcohol/week: 1.0 standard drink of alcohol     Types: 1 Cans of beer per week     Comment: rare    Drug use: Never    Sexual activity: Yes     Partners: Female     Birth control/protection: None     Social Determinants of Health     Financial Resource Strain: Low Risk  (3/28/2024)    Overall Financial Resource Strain (CARDIA)     Difficulty of Paying Living Expenses: Not hard at all   Food Insecurity: No Food Insecurity (3/28/2024)    Hunger Vital Sign     Worried About Running Out of Food in the Last Year: Never true     Ran Out of Food in the Last Year: Never true   Transportation Needs: No Transportation Needs (3/28/2024)    PRAPARE - Transportation     Lack of Transportation (Medical): No     Lack of Transportation (Non-Medical): No   Physical  "Activity: Sufficiently Active (3/28/2024)    Exercise Vital Sign     Days of Exercise per Week: 7 days     Minutes of Exercise per Session: 30 min   Stress: Stress Concern Present (3/28/2024)    [place] [employer] - Occupational Stress Questionnaire     Feeling of Stress : Rather much   Social Connections: Unknown (3/28/2024)    Social Connection and Isolation Panel [NHANES]     Frequency of Communication with Friends and Family: Twice a week     Frequency of Social Gatherings with Friends and Family: Three times a week     Active Member of Clubs or Organizations: Yes     Attends Club or Organization Meetings: 1 to 4 times per year     Marital Status:    Housing Stability: Low Risk  (3/28/2024)    Housing Stability Vital Sign     Unable to Pay for Housing in the Last Year: No     Number of Places Lived in the Last Year: 1     Unstable Housing in the Last Year: No       Family History   Adopted: Yes   Problem Relation Age of Onset    Congenital heart disease Father         bicuspicd aortic valve    Heart disease Father     Alzheimer's disease Maternal Grandmother     Alzheimer's disease Maternal Aunt 53    Depression Mother     Early death Maternal Aunt        Physical Exam     Vitals:    03/26/24 1939 03/26/24 2325 03/27/24 0047   BP: 119/84 106/73 110/78   BP Location: Right arm     Patient Position: Sitting     Pulse: 71 63 (!) 55   Resp: 17 16 16   Temp: 97.7 °F (36.5 °C) 97.7 °F (36.5 °C) 98.4 °F (36.9 °C)   TempSrc: Oral Oral    SpO2: 96% 98% 97%   Weight: 85.9 kg (189 lb 6 oz)     Height: 5' 8" (1.727 m)         Physical Exam  Exam conducted with a chaperone present.   Constitutional:       General: He is not in acute distress.     Appearance: He is well-developed. He is not toxic-appearing or diaphoretic.   HENT:      Head: Normocephalic and atraumatic.      Nose: No rhinorrhea.      Mouth/Throat:      Mouth: Mucous membranes are moist.   Eyes:      General: No scleral icterus.    "  Extraocular Movements: Extraocular movements intact.   Cardiovascular:      Rate and Rhythm: Normal rate.   Pulmonary:      Effort: No respiratory distress.      Breath sounds: No stridor.   Abdominal:      General: There is no distension.      Tenderness: There is no abdominal tenderness.   Genitourinary:     Penis: Circumcised.       Testes: Normal.         Right: Tenderness or swelling not present.         Left: Tenderness or swelling not present.       Musculoskeletal:         General: No swelling or deformity.      Cervical back: No rigidity.   Skin:     General: Skin is warm and dry.      Capillary Refill: Capillary refill takes less than 2 seconds.   Neurological:      General: No focal deficit present.      Mental Status: He is alert and oriented to person, place, and time.                   Results     Labs Reviewed   URINALYSIS, REFLEX TO URINE CULTURE - Abnormal; Notable for the following components:       Result Value    Protein, UA Trace (*)     Occult Blood UA Trace (*)     All other components within normal limits    Narrative:     Specimen Source->Urine       Imaging Results              US Lower Extremity Veins Bilateral (Final result)  Result time 03/27/24 00:11:36      Final result by Elva Alston MD (03/27/24 00:11:36)                   Impression:      No evidence of deep venous thrombosis in either lower extremity.      Electronically signed by: Elva Alston MD  Date:    03/27/2024  Time:    00:11               Narrative:    EXAMINATION:  US LOWER EXTREMITY VEINS BILATERAL    CLINICAL HISTORY:  Other injury of unspecified body region, initial encounter    TECHNIQUE:  Duplex and color flow Doppler and dynamic compression was performed of the bilateral lower extremity veins was performed.    COMPARISON:  None    FINDINGS:  Right thigh veins: The common femoral, femoral, popliteal, upper greater saphenous, and deep femoral veins are patent and free of thrombus. The veins are normally  compressible and have normal phasic flow and augmentation response.    Right calf veins: The visualized calf veins are patent.    Left thigh veins: The common femoral, femoral, popliteal, upper greater saphenous, and deep femoral veins are patent and free of thrombus. The veins are normally compressible and have normal phasic flow and augmentation response.    Left calf veins: The visualized calf veins are patent.    Miscellaneous: None                                            Initial MDM   Medical Decision Making  Patient is a 48-year-old male presenting with bluish discoloration to the left half of the glans of the penis and some pain to his groin just above his penis.  No evidence of hernia, abscess, or cellulitis.  Exam not consistent with balanitis, priapism, testicular torsion.  Exam and history and consistent penile fracture.  No evidence of Nakia's gangrene. Unclear etiology of patient's symptoms.  Discussed with urology weekly does not appear to be emergent or life-threatening etiology.  Consider possible improves come mild trauma.   Patient notes that given his history of cardiac mass, he was concerned about a DVT.  Will get ultrasound to further evaluate though very low suspicion this time.  Anticipate need for urgent outpatient follow up with Urology.                 Medications Given / Interventions   Medications - No data to display    Procedures     ED POCUS Performed: No    Reassessment and ED Course      Ultrasound shows no evidence of DVT.    Discussed results with patient.  Discussed unclear etiology for symptoms at this time.  Discussed need for further outpatient follow up with Urology.  Urology should be contacting the patient to arrange outpatient follow up.  Also encouraged the patient to call them 1st thing in the morning    Given very strict return precautions.  DC to home.         Final diagnoses:  [T14.8XXA] Bruising  [N48.89] Penile pain (Primary)           Dispo      ED Disposition  Condition    Discharge Stable            ED Prescriptions    None       Follow-up Information       Follow up With Specialties Details Why Contact Info Additional Information    Lizzie Talavera MD Internal Medicine Schedule an appointment as soon as possible for a visit in 1 week  [address] [facility] [address]  [phone]       [facility] - Urology [address] 4th Fl Urology Call today  [address]  [facility] [address] [phone] Main Building, 4th Floor Please park in [facility] and take Atrium elevator                        Natalee Lindo MD  03/28/24 [id number]

## 2024-03-27 NOTE — ED TRIAGE NOTES
Willy Bueno, a 48 y.o. male presents to the ED w/ complaint of groin pain x 30 minutes. Tip of penis blue in color    Adult Physical Assessment  LOC: Willy Bueno, 48 y.o. male verified via two identifiers.  The patient is awake, alert, oriented and speaking appropriately at this time.  APPEARANCE: Patient resting comfortably and appears to be in no acute distress at this time. Patient is clean and well groomed, patient's clothing is properly fastened.  SKIN:The skin is warm and dry, color consistent with ethnicity, patient has normal skin turgor and moist mucus membranes, skin intact, no breakdown or brusing noted.  MUSCULOSKELETAL: Patient moving all extremities well. Left underside of penis blue and cool to the touch  RESPIRATORY: Airway is open and patent, respirations are spontaneous, patient has a normal effort and rate, no accessory muscle use noted.  CARDIAC: Patient has a normal rate and rhythm, no periphreal edema noted in any extremity, capillary refill < 3 seconds in all extremities  ABDOMEN: Soft and non tender to palpation, no abdominal distention noted. Bowel sounds present in all four quadrants.  NEUROLOGIC: Eyes open spontaneously, behavior appropriate to situation, follows commands, facial expression symmetrical, bilateral hand grasp equal and even, purposeful motor response noted, normal sensation in all extremities when touched with a finger.      Triage note:  Chief Complaint   Patient presents with    Groin Pain     Started 30 mins ago, Bleeding at tip of penis     Review of patient's allergies indicates:   Allergen Reactions    Bactrim [sulfamethoxazole-trimethoprim] Blisters    Doxycycline Nausea Only     Past Medical History:   Diagnosis Date    COVID-19     9/2021; 7/25/22    Dislocated shoulder, left, sequela     x 2     Dislocated shoulder, right, sequela     Jaw fracture     Leg fracture     in childhood     Psoriasis     Right foot pain 8/24/2022    Supraspinatus tendon  tear     left    Wrist fracture, closed

## 2024-03-28 ENCOUNTER — TELEPHONE (OUTPATIENT)
Dept: PRIMARY CARE CLINIC | Facility: CLINIC | Age: 49
End: 2024-03-28
Payer: COMMERCIAL

## 2024-03-28 ENCOUNTER — OFFICE VISIT (OUTPATIENT)
Dept: UROLOGY | Facility: CLINIC | Age: 49
End: 2024-03-28
Payer: COMMERCIAL

## 2024-03-28 VITALS
BODY MASS INDEX: 28.74 KG/M2 | HEIGHT: 68 IN | WEIGHT: 189.63 LBS | DIASTOLIC BLOOD PRESSURE: 82 MMHG | SYSTOLIC BLOOD PRESSURE: 121 MMHG | HEART RATE: 70 BPM

## 2024-03-28 DIAGNOSIS — N48.9 PENILE LESION: Primary | ICD-10-CM

## 2024-03-28 DIAGNOSIS — N48.89 PENILE PAIN: ICD-10-CM

## 2024-03-28 PROCEDURE — 1159F MED LIST DOCD IN RCRD: CPT | Mod: CPTII,S$GLB,, | Performed by: UROLOGY

## 2024-03-28 PROCEDURE — 3008F BODY MASS INDEX DOCD: CPT | Mod: CPTII,S$GLB,, | Performed by: UROLOGY

## 2024-03-28 PROCEDURE — 99213 OFFICE O/P EST LOW 20 MIN: CPT | Mod: S$GLB,,, | Performed by: UROLOGY

## 2024-03-28 PROCEDURE — 99999 PR PBB SHADOW E&M-EST. PATIENT-LVL IV: CPT | Mod: PBBFAC,,, | Performed by: UROLOGY

## 2024-03-28 PROCEDURE — 3074F SYST BP LT 130 MM HG: CPT | Mod: CPTII,S$GLB,, | Performed by: UROLOGY

## 2024-03-28 PROCEDURE — 3079F DIAST BP 80-89 MM HG: CPT | Mod: CPTII,S$GLB,, | Performed by: UROLOGY

## 2024-03-28 NOTE — TELEPHONE ENCOUNTER
----- Message from Tiffany Jose sent at 3/28/2024  4:33 PM CDT -----  Contact: [phone] Patient  Patient is returning a phone call.  Who left a message for the patient: missed call  Does patient know what this is regarding:    Would you like a call back, or a response through your [facility] portal?:   Call Back Please. Thank you.  Comments:

## 2024-03-28 NOTE — TELEPHONE ENCOUNTER
I tried calling pt. No answer. Left message to return call. I was finally able to see his ER note.  I agree there is a clear demarcation of color change in regards to his glans.  I know he saw Dr. Craven 6 wks ago and his exam was normal at that time. I will loop in Dr. Craven for any further thoughts outside of bruising.   Dr. HUNT

## 2024-03-28 NOTE — PROGRESS NOTES
Subjective:       Patient ID: Willy Bueno is a 48 y.o. male.    Chief Complaint: Follow-up (Pt here on f/u from er. )    HPI  patient is here with a history of psoriasis and has had possible outbreaks on his penis.  He has no raised lesions no masses no voiding complaints but he does know skin color changes in his penis cetera now normal blue and light red.  No fever chills nausea vomiting    Past Medical History:   Diagnosis Date    COVID-19     2021; 22    Dislocated shoulder, left, sequela     x 2     Dislocated shoulder, right, sequela     Jaw fracture     Leg fracture     in childhood     Psoriasis     Right foot pain 2022    Supraspinatus tendon tear     left    Wrist fracture, closed        Past Surgical History:   Procedure Laterality Date    LIPOMA RESECTION      MOLE REMOVAL         Family History   Adopted: Yes   Problem Relation Age of Onset    Congenital heart disease Father         bicuspicd aortic valve    Heart disease Father     Alzheimer's disease Maternal Grandmother     Alzheimer's disease Maternal Aunt 53    Depression Mother     Early death Maternal Aunt        Social History     Socioeconomic History    Marital status:    Tobacco Use    Smoking status: Former     Current packs/day: 0.00     Average packs/day: 1 pack/day for 22.0 years (22.0 ttl pk-yrs)     Types: Cigarettes     Start date:      Quit date: 2018     Years since quittin.2    Smokeless tobacco: Never   Substance and Sexual Activity    Alcohol use: Yes     Alcohol/week: 1.0 standard drink of alcohol     Types: 1 Cans of beer per week     Comment: rare    Drug use: Never    Sexual activity: Yes     Partners: Female     Birth control/protection: None     Social Determinants of Health     Financial Resource Strain: Low Risk  (3/28/2024)    Overall Financial Resource Strain (CARDIA)     Difficulty of Paying Living Expenses: Not hard at all   Food Insecurity: No Food Insecurity (3/28/2024)    Hunger  Vital Sign     Worried About Running Out of Food in the Last Year: Never true     Ran Out of Food in the Last Year: Never true   Transportation Needs: No Transportation Needs (3/28/2024)    PRAPARE - Transportation     Lack of Transportation (Medical): No     Lack of Transportation (Non-Medical): No   Physical Activity: Sufficiently Active (3/28/2024)    Exercise Vital Sign     Days of Exercise per Week: 7 days     Minutes of Exercise per Session: 30 min   Stress: Stress Concern Present (3/28/2024)    [place] of [employer]ational [employer] - Occupational Stress Questionnaire     Feeling of Stress : Rather much   Social Connections: Unknown (3/28/2024)    Social Connection and Isolation Panel [NHANES]     Frequency of Communication with Friends and Family: Twice a week     Frequency of Social Gatherings with Friends and Family: Three times a week     Active Member of Clubs or Organizations: Yes     Attends Club or Organization Meetings: 1 to 4 times per year     Marital Status:    Housing Stability: Low Risk  (3/28/2024)    Housing Stability Vital Sign     Unable to Pay for Housing in the Last Year: No     Number of Places Lived in the Last Year: 1     Unstable Housing in the Last Year: No       Allergies:  Bactrim [sulfamethoxazole-trimethoprim] and Doxycycline    Medications:    Current Outpatient Medications:     albuterol (PROVENTIL/VENTOLIN HFA) 90 mcg/actuation inhaler, Inhale 2 puffs into the lungs every 6 (six) hours as needed for Wheezing or Shortness of Breath. Rescue, Disp: 18 g, Rfl: 3    calcium carbonate (TUMS) 200 mg calcium (500 mg) chewable tablet, Take 1 tablet by mouth as needed., Disp: , Rfl:     calcium carbonate/vitamin D3 (VITAMIN D-3 ORAL), Take 1 capsule by mouth every Mon, Wed, Fri., Disp: , Rfl:     clindamycin (CLEOCIN) 300 MG capsule, Take 300 mg by mouth 4 (four) times daily., Disp: , Rfl:     cyanocobalamin, vitamin B-12, (VITAMIN B-12 ORAL), Take 5,000 mcg by mouth once  daily., Disp: , Rfl:     famotidine (PEPCID) 20 MG tablet, Take 20 mg by mouth daily as needed for Heartburn., Disp: , Rfl:     minoxidiL (LONITEN) 2.5 MG tablet, Take 2.5 mg by mouth once daily., Disp: , Rfl:     montelukast (SINGULAIR) 10 mg tablet, TAKE 1 TABLET(10 MG) BY MOUTH EVERY EVENING, Disp: 90 tablet, Rfl: 3    multivitamin (THERAGRAN) per tablet, Take 1 tablet by mouth once daily., Disp: , Rfl:     triamcinolone acetonide 0.1% (KENALOG) 0.1 % ointment, Apply topically. Uses prn. Unsure of dosage, Disp: , Rfl:     Review of Systems    Objective:      Physical Exam  Constitutional:       Appearance: He is well-developed.   HENT:      Head: Normocephalic.   Cardiovascular:      Rate and Rhythm: Normal rate.   Pulmonary:      Effort: Pulmonary effort is normal.   Abdominal:      Palpations: Abdomen is soft.   Genitourinary:     Comments: Skin color changes/demarcations as described above.  No evidence of gangrene patient had vascular studies in the emergency room demonstrating no evidence of alteration of blood flow to penis.  Patient has had erections since this occurred but he has not tried to have intercourse  Skin:     General: Skin is warm.   Neurological:      Mental Status: He is alert.         Assessment:       1. Penile lesion    2. Penile pain        Plan:       Willy was seen today for follow-up.    Diagnoses and all orders for this visit:    Penile lesion  -     US Scrotum And Testicles; Future    Penile pain  -     Ambulatory referral/consult to Urology  -     US Retroperitoneal Complete; Future  -     US Pelvis Limited Non OB; Future         Patient will see his dermatologist who treats his psoriasis-Dr. Prado I do not see anything that looks like an emergency but he needs someone who has more experience with arthritis then myself

## 2024-04-01 NOTE — TELEPHONE ENCOUNTER
I d/w pt. Agree does not appear c/w Psoriasis. It is slightly improved but not completely gone.  It is not painful. Pt would like a referral to Dr. Cordova for a second opinion as discussed.  Dr. HUNT

## 2024-04-08 ENCOUNTER — HOSPITAL ENCOUNTER (OUTPATIENT)
Dept: RADIOLOGY | Facility: HOSPITAL | Age: 49
Discharge: HOME | End: 2024-04-08
Attending: UROLOGY
Payer: COMMERCIAL

## 2024-04-08 DIAGNOSIS — N48.89 PENILE PAIN: ICD-10-CM

## 2024-04-08 DIAGNOSIS — N48.9 PENILE LESION: ICD-10-CM

## 2024-04-08 PROCEDURE — 76857 US EXAM PELVIC LIMITED: CPT | Mod: TC

## 2024-04-08 PROCEDURE — 76770 US EXAM ABDO BACK WALL COMP: CPT | Mod: 26,,, | Performed by: STUDENT IN AN ORGANIZED HEALTH CARE EDUCATION/TRAINING PROGRAM

## 2024-04-08 PROCEDURE — 76870 US EXAM SCROTUM: CPT | Mod: 26,,, | Performed by: STUDENT IN AN ORGANIZED HEALTH CARE EDUCATION/TRAINING PROGRAM

## 2024-04-08 PROCEDURE — 76770 US EXAM ABDO BACK WALL COMP: CPT | Mod: TC

## 2024-04-08 PROCEDURE — 76857 US EXAM PELVIC LIMITED: CPT | Mod: 26,,, | Performed by: STUDENT IN AN ORGANIZED HEALTH CARE EDUCATION/TRAINING PROGRAM

## 2024-04-08 PROCEDURE — 76870 US EXAM SCROTUM: CPT | Mod: TC

## 2024-04-11 ENCOUNTER — TELEPHONE (OUTPATIENT)
Dept: UROLOGY | Facility: CLINIC | Age: 49
End: 2024-04-11
Payer: COMMERCIAL

## 2024-04-11 NOTE — TELEPHONE ENCOUNTER
Pt notified of results of all 3 ultrasound. P/ dr osman, no follow up needed at this time. He will call prn

## 2024-05-01 ENCOUNTER — OFFICE VISIT (OUTPATIENT)
Dept: CARDIOLOGY | Facility: CLINIC | Age: 49
End: 2024-05-01
Payer: COMMERCIAL

## 2024-05-01 VITALS
HEART RATE: 62 BPM | SYSTOLIC BLOOD PRESSURE: 127 MMHG | HEIGHT: 68 IN | BODY MASS INDEX: 27.6 KG/M2 | DIASTOLIC BLOOD PRESSURE: 85 MMHG | WEIGHT: 182.13 LBS

## 2024-05-01 DIAGNOSIS — I77.89 AORTIC ROOT ENLARGEMENT: ICD-10-CM

## 2024-05-01 DIAGNOSIS — R93.1 ABNORMAL ECHOCARDIOGRAM: Primary | ICD-10-CM

## 2024-05-01 PROCEDURE — 99215 OFFICE O/P EST HI 40 MIN: CPT | Mod: S$GLB,,, | Performed by: INTERNAL MEDICINE

## 2024-05-01 PROCEDURE — 3008F BODY MASS INDEX DOCD: CPT | Mod: CPTII,S$GLB,, | Performed by: INTERNAL MEDICINE

## 2024-05-01 PROCEDURE — 3079F DIAST BP 80-89 MM HG: CPT | Mod: CPTII,S$GLB,, | Performed by: INTERNAL MEDICINE

## 2024-05-01 PROCEDURE — 3074F SYST BP LT 130 MM HG: CPT | Mod: CPTII,S$GLB,, | Performed by: INTERNAL MEDICINE

## 2024-05-01 PROCEDURE — 99999 PR PBB SHADOW E&M-EST. PATIENT-LVL III: CPT | Mod: PBBFAC,,, | Performed by: INTERNAL MEDICINE

## 2024-05-01 PROCEDURE — 1159F MED LIST DOCD IN RCRD: CPT | Mod: CPTII,S$GLB,, | Performed by: INTERNAL MEDICINE

## 2024-05-01 NOTE — PATIENT INSTRUCTIONS
Assessment/Plan:  Willy Bueno is a 48 y.o. male with overweight, former smoker, who presents for a follow up appointment.    1. Enlarged Ascending Aorta- Echo shows mild enlargement of ascending aorta.  There appears to be lipomatous hypertrophy of the interatrial septum.  No aortic insufficiency.  CTA chest is unremarkable.  Given family history of bicuspid aortic valve in his father. Echo on 5/19/2023 revealed normal systolic function with EF of 65%, Normal left ventricular diastolic function. PA systolic pressure is 30 mmHg. Sinuses of Valsalva is mildly dilated. CTA Chest/Abdomen/Pelvis on 5/31/2023 revealed 3 vessel arch. The proximal arch vessels are patent.  The thoracic aorta tapers normally.  The celiac axis, SMA, bilateral renal arteries, bilateral accessory renal arteries, DELLA, and distal aorto iliac branches all are patent.  No findings to suggest aneurysm or dissection. No pulmonary thromboembolism. Right hepatic lobe lesion suggests hemangioma, smaller lesion within the hepatic dome may reflect flash filling hemangioma although incompletely characterized.  Repeat echo in 1 year (5/2024).      2. Overweight- Encourage diet, exercise, and weight loss.     3. Elevated Blood Pressure- Controlled.  Continue to monitor.      4. ASCVD- The 10-year ASCVD risk score (Marcell DK, et al., 2019) is: 2%.  Encourage diet, exercise, and weight loss.       Will call pt with result of echo  Follow up in 6 months

## 2024-05-01 NOTE — PROGRESS NOTES
[facility]      Chief Complaint   Patient presents with    Palpitations       Patient ID: Willy Bueno is a 48 y.o. male with overweight, former smoker, who presents for a follow up  appointment.  Pertinent history/events are as follows:     -Pt kindly referred by Dr. Talavera for evaluation of enlarged aorta.     - At clinic visit on  5/25/2023 Mr. Bueno is accompanied by his wife.  He reports no chest pain or SOB.  He reports family history of bicuspid aortic valve in his father.  Formerly smoked a pack a day for 22 years.  Quit in 2018.  Echo on 6/19/2023 demonstrated EF 65%; normal left ventricular diastolic function; normal right ventricular size with normal right ventricular systolic function; mild tricuspid regurgitation; intermediate central venous pressure (8 mmHg); estimated PA systolic pressure is 30 mmHg; the sinuses of Valsalva is mildly dilated; there appears to be lipomatous hypertrophy of the interatrial septum.  CTA Chest on 5/23/2023 is unremarkable.    Plan:  Enlarged Ascending Aorta- Echo shows mild enlargement of ascending aorta.  There appears to be lipomatous hypertrophy of the interatrial septum.  No aortic insufficiency.  CTA chest is unremarkable.  Given family history of bicuspid aortic valve in his father, will check CTA chest/abd/pelvis to rule out aortopathy.  Repeat echo in 1 year.    Overweight- Encourage diet, exercise, and weight loss.     -At follow up clinic visit on 8/9/2023, Mr. Bueno reported no chest pain, palpitations, light headedness or SOB. H reports blood pressure is 135/85 at home most of the time. Echo on 5/19/2023 revealed normal systolic function with EF of 65%, Normal left ventricular diastolic function. PA systolic pressure is 30 mmHg. Sinuses of Valsalva is mildly dilated. CTA Chest/Abdomen/Pelvis on 5/31/2023 revealed 3 vessel arch.  The proximal arch vessels are patent.  The thoracic aorta tapers normally.  The celiac axis, SMA,  bilateral renal arteries, bilateral accessory renal arteries, DELLA, and distal aorto iliac branches all are patent.  No findings to suggest aneurysm or dissection. No pulmonary thromboembolism. Right hepatic lobe lesion suggests hemangioma, smaller lesion within the hepatic dome may reflect flash filling hemangioma although incompletely characterized.  Plan:   Enlarged Ascending Aorta- Echo shows mild enlargement of ascending aorta.  There appears to be lipomatous hypertrophy of the interatrial septum.  No aortic insufficiency.  CTA chest is unremarkable.  Given family history of bicuspid aortic valve in his father. Echo on 5/19/2023 revealed normal systolic function with EF of 65%, Normal left ventricular diastolic function. PA systolic pressure is 30 mmHg. Sinuses of Valsalva is mildly dilated. CTA Chest/Abdomen/Pelvis on 5/31/2023 revealed 3 vessel arch. The proximal arch vessels are patent.  The thoracic aorta tapers normally.  The celiac axis, SMA, bilateral renal arteries, bilateral accessory renal arteries, DELLA, and distal aorto iliac branches all are patent.  No findings to suggest aneurysm or dissection. No pulmonary thromboembolism. Right hepatic lobe lesion suggests hemangioma, smaller lesion within the hepatic dome may reflect flash filling hemangioma although incompletely characterized.  Repeat echo in 1 year.    Overweight- Encourage diet, exercise, and weight loss.   High blood pressure: Blood pressure in 135/85 at home. Pt to limit sodium 2000 mg/day. Patient to keep a log of blood pressure and heart rate at home and bring in next clinic visit. Report to the clinic if blood pressure greater than 140 or less than 100 consistently or experience light headedness/syncope related symptoms. LDL 92 on 8/24/22.    HPI:  Mr. Bueno reports doing well with no chest pain, palpitations, light headedness or SOB.       H reports blood pressure is 135/85 at home most of the time. Echo on 5/19/2023 revealed normal  systolic function with EF of 65%, Normal left ventricular diastolic function. PA systolic pressure is 30 mmHg. Sinuses of Valsalva is mildly dilated. CTA Chest/Abdomen/Pelvis on 2023 revealed 3 vessel arch.  The proximal arch vessels are patent.  The thoracic aorta tapers normally.  The celiac axis, SMA, bilateral renal arteries, bilateral accessory renal arteries, DELLA, and distal aorto iliac branches all are patent.  No findings to suggest aneurysm or dissection. No pulmonary thromboembolism. Right hepatic lobe lesion suggests hemangioma, smaller lesion within the hepatic dome may reflect flash filling hemangioma although incompletely characterized.    Past Medical History:   Diagnosis Date    COVID-19     2021; 24    Dislocated shoulder, left, sequela     x 2     Dislocated shoulder, right, sequela     Jaw fracture     Leg fracture     in childhood     Psoriasis     Right foot pain 2022    Supraspinatus tendon tear     left    Wrist fracture, closed      Past Surgical History:   Procedure Laterality Date    LIPOMA RESECTION      MOLE REMOVAL       Social History     Socioeconomic History    Marital status:    Tobacco Use    Smoking status: Former     Current packs/day: 0.00     Average packs/day: 1 pack/day for 22.0 years (22.0 ttl pk-yrs)     Types: Cigarettes     Start date:      Quit date: 2018     Years since quittin.3    Smokeless tobacco: Never   Substance and Sexual Activity    Alcohol use: Yes     Alcohol/week: 1.0 standard drink of alcohol     Types: 1 Cans of beer per week     Comment: rare    Drug use: Never    Sexual activity: Yes     Partners: Female     Birth control/protection: None     Social Determinants of Health     Financial Resource Strain: Low Risk  (3/28/2024)    Overall Financial Resource Strain (CARDIA)     Difficulty of Paying Living Expenses: Not hard at all   Food Insecurity: No Food Insecurity (3/28/2024)    Hunger Vital Sign     Worried About  Running Out of Food in the Last Year: Never true     Ran Out of Food in the Last Year: Never true   Transportation Needs: No Transportation Needs (3/28/2024)    PRAPARE - Transportation     Lack of Transportation (Medical): No     Lack of Transportation (Non-Medical): No   Physical Activity: Sufficiently Active (3/28/2024)    Exercise Vital Sign     Days of Exercise per Week: 7 days     Minutes of Exercise per Session: 30 min   Stress: Stress Concern Present (3/28/2024)    [employer] - Occupational Stress Questionnaire     Feeling of Stress : Rather much   Housing Stability: Low Risk  (3/28/2024)    Housing Stability Vital Sign     Unable to Pay for Housing in the Last Year: No     Number of Places Lived in the Last Year: 1     Unstable Housing in the Last Year: No     Family History   Adopted: Yes   Problem Relation Name Age of Onset    Congenital heart disease Father Chris Jackson         bicuspicd aortic valve    Heart disease Father Chris Jackson     Alzheimer's disease Maternal Grandmother      Alzheimer's disease Maternal Aunt  53    Depression Mother Mitali Mccloud     Early death Maternal Aunt Unknown        Review of patient's allergies indicates:   Allergen Reactions    Bactrim [sulfamethoxazole-trimethoprim] Blisters    Doxycycline Nausea Only       Medication List with Changes/Refills   Current Medications    ALBUTEROL (PROVENTIL/VENTOLIN HFA) 90 MCG/ACTUATION INHALER    Inhale 2 puffs into the lungs every 6 (six) hours as needed for Wheezing or Shortness of Breath. Rescue    CALCIUM CARBONATE (TUMS) 200 MG CALCIUM (500 MG) CHEWABLE TABLET    Take 1 tablet by mouth as needed.    CALCIUM CARBONATE/VITAMIN D3 (VITAMIN D-3 ORAL)    Take 1 capsule by mouth every Mon, Wed, Fri.    CYANOCOBALAMIN, VITAMIN B-12, (VITAMIN B-12 ORAL)    Take 5,000 mcg by mouth once daily.    FAMOTIDINE (PEPCID) 20 MG TABLET    Take 20 mg by mouth daily as needed for Heartburn.    MINOXIDIL (LONITEN) 2.5  "MG TABLET    Take 2.5 mg by mouth once daily.    MONTELUKAST (SINGULAIR) 10 MG TABLET    TAKE 1 TABLET(10 MG) BY MOUTH EVERY EVENING    MULTIVITAMIN (THERAGRAN) PER TABLET    Take 1 tablet by mouth once daily.    TRIAMCINOLONE ACETONIDE 0.1% (KENALOG) 0.1 % OINTMENT    Apply topically. Uses prn. Unsure of dosage   Discontinued Medications    CLINDAMYCIN (CLEOCIN) 300 MG CAPSULE    Take 300 mg by mouth 4 (four) times daily.       Review of Systems  Constitution: Denies chills, fever, and sweats.  HENT: Denies headaches or blurry vision.  Cardiovascular: Denies chest pain or irregular heart beat.  Respiratory: Denies cough or shortness of breath.  Gastrointestinal: Denies abdominal pain, nausea, or vomiting.  Musculoskeletal: Denies muscle cramps.  Neurological: Denies dizziness or focal weakness.  Psychiatric/Behavioral: Normal mental status.  Hematologic/Lymphatic: Denies bleeding problem or easy bruising/bleeding.  Skin: Denies rash or suspicious lesions    Physical Examination  /85   Pulse 62   Ht 5' 8" (1.727 m)   Wt 82.6 kg (182 lb 1.6 oz)   BMI 27.69 kg/m²     Constitutional: No acute distress, conversant  HEENT: Sclera anicteric, Pupils equal, round and reactive to light, extraocular motions intact, Oropharynx clear  Neck: No JVD, no carotid bruits  Cardiovascular: regular rate and rhythm, no murmur, rubs or gallops, normal S1/S2  Pulmonary: Clear to auscultation bilaterally  Abdominal: Abdomen soft, nontender, nondistended, positive bowel sounds  Extremities: No lower extremity edema,   Pulses:  Carotid pulses are 2+ on the right side, and 2+ on the left side.  Radial pulses are 2+ on the right side, and 2+ on the left side.   Femoral pulses are 2+ on the right side, and 2+ on the left side.  Popliteal pulses are 2+ on the right side, and 2+ on the left side.   Dorsalis pedis pulses are 2+ on the right side, and 2+ on the left side.   Posterior tibial pulses are 2+ on the right side, and 2+ on the " "left side.    Skin: No ecchymosis, erythema, or ulcers  Psych: Alert and oriented x 3, appropriate affect  Neuro: CNII-XII intact, no focal deficits    Labs:  Most Recent Data  CBC:   Lab Results   Component Value Date    WBC 6.6 12/22/2023    HGB 15.7 12/22/2023    HCT 46.8 12/22/2023     12/22/2023    MCV 87.5 12/22/2023    RDW 12.4 12/22/2023     BMP:   Lab Results   Component Value Date     12/22/2023    K 4.3 12/22/2023     12/22/2023    CO2 29 12/22/2023    BUN 18 12/22/2023    CREATININE 0.85 12/22/2023    GLU 93 12/22/2023    CALCIUM 9.5 12/22/2023    MG 2.2 08/24/2022     LFTS;   Lab Results   Component Value Date    PROT 6.7 12/22/2023    ALBUMIN 4.5 12/22/2023    BILITOT 0.9 12/22/2023    AST 17 12/22/2023    ALKPHOS 77 08/24/2022    ALT 19 12/22/2023     COAGS: No results found for: "INR", "PROTIME", "PTT"  FLP:   Lab Results   Component Value Date    CHOL 168 12/22/2023    HDL 44 12/22/2023    LDLCALC 97 12/22/2023    TRIG 172 (H) 12/22/2023    CHOLHDL 3.8 12/22/2023     CARDIAC: No results found for: "TROPONINI", "CKTOTAL", "CKMB", "BNP"    Imaging:    Echo 5/19/2023  Normal systolic function.  The estimated ejection fraction is 65%.  Normal left ventricular diastolic function.  Normal right ventricular size with normal right ventricular systolic function.  Mild tricuspid regurgitation.  Intermediate central venous pressure (8 mmHg).  The estimated PA systolic pressure is 30 mmHg.  The sinuses of Valsalva is mildly dilated.    CTA Chest/Abdomen/Pelvis 5/31/2023  There is a 3 vessel arch.  The proximal arch vessels are patent.  The thoracic aorta tapers normally.  The celiac axis, SMA, bilateral renal arteries, bilateral accessory renal arteries, DELLA, and distal aorto iliac branches all are patent.  No findings to suggest aneurysm or dissection.   No pulmonary thromboembolism.   Right hepatic lobe lesion suggests hemangioma, smaller lesion within the hepatic dome may reflect flash " filling hemangioma although incompletely characterized.    EKG 8/24/2022:  Sinus bradycardia    CTA Chest 5/23/2023:  Heart size within normal limits.    No pericardial or pleural effusion.    Thoracic aorta normal caliber.    Mildly enlarged left thyroid lobe with substernal extension.  No lymphadenopathy.    Echo 6/19/2023:  Normal systolic function.  The estimated ejection fraction is 65%.  Normal left ventricular diastolic function.  Normal right ventricular size with normal right ventricular systolic function.  Mild tricuspid regurgitation.  Intermediate central venous pressure (8 mmHg).  The estimated PA systolic pressure is 30 mmHg.  The sinuses of Valsalva is mildly dilated.    Assessment/Plan:  Willy Bueno is a 48 y.o. male with overweight, former smoker, who presents for a follow up appointment.    1. Enlarged Ascending Aorta- Echo shows mild enlargement of ascending aorta.  There appears to be lipomatous hypertrophy of the interatrial septum.  No aortic insufficiency.  CTA chest is unremarkable.  Given family history of bicuspid aortic valve in his father. Echo on 5/19/2023 revealed normal systolic function with EF of 65%, Normal left ventricular diastolic function. PA systolic pressure is 30 mmHg. Sinuses of Valsalva is mildly dilated. CTA Chest/Abdomen/Pelvis on 5/31/2023 revealed 3 vessel arch. The proximal arch vessels are patent.  The thoracic aorta tapers normally.  The celiac axis, SMA, bilateral renal arteries, bilateral accessory renal arteries, DELLA, and distal aorto iliac branches all are patent.  No findings to suggest aneurysm or dissection. No pulmonary thromboembolism. Right hepatic lobe lesion suggests hemangioma, smaller lesion within the hepatic dome may reflect flash filling hemangioma although incompletely characterized.  Repeat echo in 1 year (5/2024).      2. Overweight- Encourage diet, exercise, and weight loss.     3. Elevated Blood Pressure- Controlled.  Continue to monitor.       4. ASCVD- The 10-year ASCVD risk score (Marcell GOULD, et al., 2019) is: 2%.  Encourage diet, exercise, and weight loss.       Will call pt with result of echo  Follow up in 6 months    Total duration of face to face visit time 45 minutes.  Total time spent counseling greater than fifty percent of total visit time.  Counseling included discussion regarding imaging findings, diagnosis, possibilities, treatment options, risks and benefits.  The patient had many questions regarding the options and long-term effects.    Dutch Hicks MD, PhD  Interventional Cardiology

## 2024-05-14 ENCOUNTER — HOSPITAL ENCOUNTER (OUTPATIENT)
Dept: CARDIOLOGY | Facility: HOSPITAL | Age: 49
Discharge: HOME | End: 2024-05-14
Attending: INTERNAL MEDICINE
Payer: COMMERCIAL

## 2024-05-14 VITALS
SYSTOLIC BLOOD PRESSURE: 108 MMHG | WEIGHT: 182 LBS | DIASTOLIC BLOOD PRESSURE: 63 MMHG | HEART RATE: 76 BPM | HEIGHT: 68 IN | BODY MASS INDEX: 27.58 KG/M2

## 2024-05-14 DIAGNOSIS — R93.1 ABNORMAL ECHOCARDIOGRAM: ICD-10-CM

## 2024-05-14 LAB
ASCENDING AORTA: 3.4 CM
AV INDEX (PROSTH): 0.89
AV MEAN GRADIENT: 4 MMHG
AV PEAK GRADIENT: 9 MMHG
AV VALVE AREA BY VELOCITY RATIO: 2.65 CM²
AV VALVE AREA: 2.96 CM²
AV VELOCITY RATIO: 0.79
BSA FOR ECHO PROCEDURE: 1.99 M2
CV ECHO LV RWT: 0.41 CM
DOP CALC AO PEAK VEL: 1.46 M/S
DOP CALC AO VTI: 23.05 CM
DOP CALC LVOT AREA: 3.3 CM2
DOP CALC LVOT DIAMETER: 2.06 CM
DOP CALC LVOT PEAK VEL: 1.16 M/S
DOP CALC LVOT STROKE VOLUME: 68.16 CM3
DOP CALCLVOT PEAK VEL VTI: 20.46 CM
E WAVE DECELERATION TIME: 147.19 MSEC
E/A RATIO: 0.64
E/E' RATIO: 4.81 M/S
ECHO LV POSTERIOR WALL: 0.8 CM (ref 0.6–1.1)
EJECTION FRACTION: 65 %
FRACTIONAL SHORTENING: 33 % (ref 28–44)
INTERVENTRICULAR SEPTUM: 0.91 CM (ref 0.6–1.1)
IVRT: 95.15 MSEC
LA MAJOR: 5.3 CM
LA MINOR: 5.3 CM
LA WIDTH: 3.28 CM
LEFT ATRIUM SIZE: 3.16 CM
LEFT ATRIUM VOLUME INDEX MOD: 22.3 ML/M2
LEFT ATRIUM VOLUME INDEX: 23.8 ML/M2
LEFT ATRIUM VOLUME MOD: 43.63 CM3
LEFT ATRIUM VOLUME: 46.69 CM3
LEFT INTERNAL DIMENSION IN SYSTOLE: 2.65 CM (ref 2.1–4)
LEFT VENTRICLE DIASTOLIC VOLUME INDEX: 34.65 ML/M2
LEFT VENTRICLE DIASTOLIC VOLUME: 67.91 ML
LEFT VENTRICLE MASS INDEX: 51 G/M2
LEFT VENTRICLE SYSTOLIC VOLUME INDEX: 13.2 ML/M2
LEFT VENTRICLE SYSTOLIC VOLUME: 25.83 ML
LEFT VENTRICULAR INTERNAL DIMENSION IN DIASTOLE: 3.95 CM (ref 3.5–6)
LEFT VENTRICULAR MASS: 100.18 G
LV LATERAL E/E' RATIO: 3.82 M/S
LV SEPTAL E/E' RATIO: 6.5 M/S
MV PEAK A VEL: 1.01 M/S
MV PEAK E VEL: 0.65 M/S
MV STENOSIS PRESSURE HALF TIME: 42.69 MS
MV VALVE AREA P 1/2 METHOD: 5.15 CM2
OHS CV RV/LV RATIO: 0.8 CM
PISA TR MAX VEL: 2.61 M/S
RA MAJOR: 4.85 CM
RA PRESSURE ESTIMATED: 3 MMHG
RA WIDTH: 3.92 CM
RIGHT VENTRICULAR END-DIASTOLIC DIMENSION: 3.17 CM
RV TB RVSP: 6 MMHG
SINUS: 3.9 CM
STJ: 3.2 CM
TDI LATERAL: 0.17 M/S
TDI SEPTAL: 0.1 M/S
TDI: 0.14 M/S
TR MAX PG: 27 MMHG
TRICUSPID ANNULAR PLANE SYSTOLIC EXCURSION: 2.27 CM
TV REST PULMONARY ARTERY PRESSURE: 30 MMHG
Z-SCORE OF LEFT VENTRICULAR DIMENSION IN END DIASTOLE: -3.51
Z-SCORE OF LEFT VENTRICULAR DIMENSION IN END SYSTOLE: -2.08

## 2024-05-14 PROCEDURE — 93306 TTE W/DOPPLER COMPLETE: CPT | Mod: 26,,, | Performed by: INTERNAL MEDICINE

## 2024-05-14 PROCEDURE — 93306 TTE W/DOPPLER COMPLETE: CPT | Mod: PO

## 2024-05-15 ENCOUNTER — PATIENT MESSAGE (OUTPATIENT)
Dept: CARDIOLOGY | Facility: CLINIC | Age: 49
End: 2024-05-15
Payer: COMMERCIAL

## 2024-05-20 ENCOUNTER — OFFICE VISIT (OUTPATIENT)
Dept: PRIMARY CARE CLINIC | Facility: CLINIC | Age: 49
End: 2024-05-20
Payer: COMMERCIAL

## 2024-05-20 DIAGNOSIS — U07.1 COVID-19: Primary | ICD-10-CM

## 2024-05-20 PROCEDURE — 1160F RVW MEDS BY RX/DR IN RCRD: CPT | Mod: CPTII,95,, | Performed by: INTERNAL MEDICINE

## 2024-05-20 PROCEDURE — 99213 OFFICE O/P EST LOW 20 MIN: CPT | Mod: 95,,, | Performed by: INTERNAL MEDICINE

## 2024-05-20 PROCEDURE — 1159F MED LIST DOCD IN RCRD: CPT | Mod: CPTII,95,, | Performed by: INTERNAL MEDICINE

## 2024-05-20 RX ORDER — BENZONATATE 200 MG/1
200 CAPSULE ORAL 3 TIMES DAILY PRN
Qty: 30 CAPSULE | Refills: 0 | Status: SHIPPED | OUTPATIENT
Start: 2024-05-20 | End: 2024-05-30

## 2024-05-20 RX ORDER — NIRMATRELVIR AND RITONAVIR 300-100 MG
KIT ORAL
Qty: 30 TABLET | Refills: 0 | Status: SHIPPED | OUTPATIENT
Start: 2024-05-20 | End: 2024-05-25

## 2024-05-20 NOTE — PROGRESS NOTES
"[facility] Note    Chief Complaint    No chief complaint on file.      History of Present Illness      Willy Bueno is a 48 y.o. WM with Psoriasis, Hypertriglyceridemia, Nicotine dependence in remission presents to recent issues. Referred by wife. 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  Last  virtual visit - 3/4/24      The patient location is: Home   The chief complaint leading to consultation is: "COVID 19    Visit type: audiovisual    Face to Face time with patient: 10 min.  10 minutes of total time spent on the encounter, which includes face to face time and non-face to face time preparing to see the patient (eg, review of tests), Obtaining and/or reviewing separately obtained history, Documenting clinical information in the electronic or other health record, Independently interpreting results (not separately reported) and communicating results to the patient/family/caregiver, or Care coordination (not separately reported).         Each patient to whom he or she provides medical services by telemedicine is:  (1) informed of the relationship between the physician and patient and the respective role of any other health care provider with respect to management of the patient; and (2) notified that he or she may decline to receive medical services by telemedicine and may withdraw from such care at any time.    Notes:     COVID 19 - wife tested positive Last Fri 5/17/24. The next AM he woke up coughing, achey, fatigued and tested pos. .  Flonase 2 SEN/d.  Sx's worsened Yest with F/C, BA worsened.  Today is similar to yest.  Rec supportive care.  Rec strict hand hygiene.  Gargle with salt water. Rec APAP/Ibuprofen prn T > 100.3/pain. RTC or alert MD if Symptoms persist/worsen.    Lipomatous hypertrophy of the interatrial septum  -Echo - 5/19/23 - Normal EF -65%. Trace MR, mild Tricuspid regurgitation.There appears to be lipomatous hypertrophy of the interatrial septum.   Sinuses of Valsalva mildly " dilated.  Fu with Dr. Hicks . CTA chest - 5/23/23 - no evidence of a pulmonary embolism. Thoracic aorta normal caliber. Trace amount of scattered interstitial thickening Incidentally it also showed a Mildly enlarged left thyroid lobe with substernal extension. Splenomegaly measuring 14.3 cm. CTA CAP - 5/31/23 - No findings to suggest aortic aneurysm or dissection.No pulmonary thromboembolism. Right hepatic lobe lesion suggests hemangioma, smaller lesion within the hepatic dome may reflect flash filling hemangioma although incompletely characterized. Repeat Echo - 5/14/24  -- EF 65%; Aortic root is mildly dilated measuring 3.9 cm.     Thyroid nodule - Thyroid U/S - 5/24/23 - 2.7 cm and rec FNA Fu with Dr. Monique. FNA - 6/5/23 - benign. Plan follow up in 1 year with TSH and thyroid u/s prior.  Has order.      Vit B12 def - B12 386 - 5/2/23. Cont. Vit B12 1000 mcg/d.     Vit D def - Vit D 24. Cont.  Vit D3 1000 u/d.      HTG -Improved 172.  It has been as high as 400 in past.      ADD - d/x ADD in 1997 or 1998. He was on medication in college but stopped taking it around 2003. Rec formal testing so can Rx pharmacotherapy if needed in future.      Psoriasis - Pt on topicals per Mary, Dr. Nathaniel Prado     GERD - EGD - Grade I esophagitis, Gastritis, Duodenitis.  Rec reflux prec.Takes Pepcid prn.      Asthma -  +h/o Exercise induced asthma in teens. He reports worsening of Sx's after recently cutting the grass last wk. ?allergic triggers. Uses albuterol prn. Singulair has helped.   Rec consider PCV 20.      Colon Polyps - repeat 2/10/23 -  C-scope 3 yrs.      Fam h/o Bicuspid aortic valve and aneurysm - Echo - 5/14/24  -- EF 65%; Aortic root is mildly dilated measuring 3.9 cm.  Fu by Cards.      H/o Flank pain - Fu with Dr Merissa ROGERS. Renal U/S - Stable 6.1 cm Right hepatic heterogeneous mass favored to represent a hemangioma.   KUB - 2/5/24 - Moderate amount of fecal matter scattered through the colon. Pain was felt  to be due to possible bladder spasms.         HCM - Flu - 10/17/22;  Tdap - 10/17/18; PCV 20 - none- defers; Shingrix - due at 52 y.o.;  COVID -19 -Vaccine 1/12/21; # 2 2/3/21; # 3 12/27/21; #4 12/27/22;  Hep C Screen - 8/4/22 - neg.; HIV Screen - utd - neg.; C-scope - 2/10/23- polyps - repeat 3 yrs;  PSA - ; Prev PCP - Dr. Elaine;  - Dr. Campbell; Ortho - Dr. Suárez; Derm - Dr. Prado; Ophtho -plans to see Dr. Jones; GI - Dr. Flowers/Dr. Goodson;  - Dr. Campbell; Well visit - 9/8/23         Patient Care Team:  Lizzie Talavera MD as PCP - General (Internal Medicine)  Listete Altamirano MA as Care Coordinator     Health Maintenance:  Immunization History   Administered Date(s) Administered    COVID-19, MRNA, LN-S, PF (Pfizer) (Purple Cap) 01/12/2021, 02/03/2021, 12/27/2021    COVID-19, mRNA, LNP-S, bivalent booster, PF (PFIZER OMICRON) 12/27/2022    Influenza (FLUBLOK) - Quadrivalent - Recombinant - PF *Preferred* (egg allergy) 10/01/2020    Influenza - Quadrivalent 10/10/2019    Influenza - Quadrivalent - MDCK - PF 09/23/2023    Influenza - Quadrivalent - PF *Preferred* (6 months and older) 02/11/2019, 10/10/2019, 10/08/2021, 10/17/2022    Tdap 10/17/2018      Health Maintenance   Topic Date Due    Colorectal Cancer Screening  02/10/2026    TETANUS VACCINE  10/17/2028    Lipid Panel  12/22/2028    Hepatitis C Screening  Completed        Past Medical History:  Past Medical History:   Diagnosis Date    COVID-19     9/2021; 7/25/22    Dislocated shoulder, left, sequela     x 2     Dislocated shoulder, right, sequela     Jaw fracture     Leg fracture     in childhood     Psoriasis     Right foot pain 8/24/2022    Supraspinatus tendon tear     left    Wrist fracture, closed        Past Surgical History:   has a past surgical history that includes Lipoma resection (1996) and Mole removal.    Family History:  family history includes Alzheimer's disease in his maternal grandmother; Alzheimer's disease (age  of onset: 53) in his maternal aunt; Congenital heart disease in his father; Depression in his mother; Early death in his maternal aunt; Heart disease in his father. He was adopted.     Social History:  Social History     Tobacco Use    Smoking status: Former     Current packs/day: 0.00     Average packs/day: 1 pack/day for 22.0 years (22.0 ttl pk-yrs)     Types: Cigarettes     Start date:      Quit date: 2018     Years since quittin.3    Smokeless tobacco: Never   Substance Use Topics    Alcohol use: Yes     Alcohol/week: 1.0 standard drink of alcohol     Types: 1 Cans of beer per week     Comment: rare    Drug use: Never       Review of Systems   Constitutional:  Positive for chills and fever.   HENT:  Positive for postnasal drip, rhinorrhea and sore throat. Negative for ear pain.         Green rhinrrhea   Respiratory:  Positive for cough and shortness of breath. Negative for wheezing.         Prod brown sputum. He reports a little SOB.  Rec using his albuterol prn.    Cardiovascular:  Positive for chest pain.        Only with coughing.    Musculoskeletal:  Positive for myalgias.   Integumentary:  Negative for rash.   Allergic/Immunologic: Positive for environmental allergies.   Neurological:  Positive for headaches.        Medications:    Current Outpatient Medications:     albuterol (PROVENTIL/VENTOLIN HFA) 90 mcg/actuation inhaler, Inhale 2 puffs into the lungs every 6 (six) hours as needed for Wheezing or Shortness of Breath. Rescue, Disp: 18 g, Rfl: 3    benzonatate (TESSALON) 200 MG capsule, Take 1 capsule (200 mg total) by mouth 3 (three) times daily as needed for Cough., Disp: 30 capsule, Rfl: 0    calcium carbonate (TUMS) 200 mg calcium (500 mg) chewable tablet, Take 1 tablet by mouth as needed., Disp: , Rfl:     calcium carbonate/vitamin D3 (VITAMIN D-3 ORAL), Take 1 capsule by mouth every Mon, Wed, Fri., Disp: , Rfl:     cyanocobalamin, vitamin B-12, (VITAMIN B-12 ORAL), Take 5,000 mcg by mouth  once daily., Disp: , Rfl:     famotidine (PEPCID) 20 MG tablet, Take 20 mg by mouth daily as needed for Heartburn., Disp: , Rfl:     minoxidiL (LONITEN) 2.5 MG tablet, Take 2.5 mg by mouth once daily., Disp: , Rfl:     montelukast (SINGULAIR) 10 mg tablet, TAKE 1 TABLET(10 MG) BY MOUTH EVERY EVENING, Disp: 90 tablet, Rfl: 3    multivitamin (THERAGRAN) per tablet, Take 1 tablet by mouth once daily., Disp: , Rfl:     nirmatrelvir-ritonavir (PAXLOVID) 300 mg (150 mg x 2)-100 mg copackaged tablets (EUA), Take 3 tablets by mouth 2 (two) times daily. Each dose contains 2 nirmatrelvir (pink tablets) and 1 ritonavir (white tablet). Take all 3 tablets together, Disp: 30 tablet, Rfl: 0    triamcinolone acetonide 0.1% (KENALOG) 0.1 % ointment, Apply topically. Uses prn. Unsure of dosage, Disp: , Rfl:      Allergies:  Review of patient's allergies indicates:   Allergen Reactions    Bactrim [sulfamethoxazole-trimethoprim] Blisters    Doxycycline Nausea Only       Physical Exam                    Physical Exam  Vitals reviewed.   Constitutional:       General: He is not in acute distress.     Appearance: Normal appearance. He is ill-appearing. He is not toxic-appearing or diaphoretic.      Comments: +congested and sniffling   HENT:      Head: Normocephalic and atraumatic.   Pulmonary:      Effort: No respiratory distress.   Neurological:      General: No focal deficit present.      Mental Status: He is alert and oriented to person, place, and time.   Psychiatric:         Mood and Affect: Mood normal.         Behavior: Behavior normal.          Laboratory:  CBC:  Recent Labs   Lab 08/24/22  0843 12/22/23  0849   WBC 6.54 6.6   RBC 5.19 5.35   Hemoglobin 15.3 15.7   Hematocrit 45.4 46.8   Platelets 141 L 195   MCV 88 87.5   MCH 29.5 29.3   MCHC 33.7 33.5       CMP:  Recent Labs   Lab 08/24/22  0843 12/22/23  0849   Glucose 94 93   Calcium 9.1 9.5   Albumin 4.1 4.5   Total Protein 6.5 6.7   Sodium 141 141   Potassium 4.0 4.3   CO2  27 29   Chloride 107 104   BUN 13 18   Creatinine 0.8 0.85   Alkaline Phosphatase 77  --    ALT 22 19   AST 15 17   Total Bilirubin 0.9 0.9           URINALYSIS:  Recent Labs   Lab 03/26/24  2224   Color, UA Yellow   Specific Gravity, UA 1.025   pH, UA 6.0   Protein, UA Trace A   Nitrite, UA Negative   Leukocytes, UA Negative        LIPIDS:  Recent Labs   Lab 08/24/22  0843 12/22/23  0849   TSH 0.839 0.45   HDL 35 L 44   Cholesterol 169 168   Triglycerides 174 H 172 H   LDL Cholesterol 99.2 97   HDL/Cholesterol Ratio 20.7 3.8   Non-HDL Cholesterol 134  --    Non HDL Chol. (LDL+VLDL)  --  124   Total Cholesterol/HDL Ratio 4.8  --        TSH:  Recent Labs   Lab 08/24/22  0843 12/22/23  0849   TSH 0.839 0.45           Recent Labs   Lab 08/24/22  0843 12/22/23  0849   PSADIAG  --  1.97   Hepatitis C Ab Negative  --        Assessment/Plan     Willy Bueno is a 48 y.o.male with:    COVID-19  -     nirmatrelvir-ritonavir (PAXLOVID) 300 mg (150 mg x 2)-100 mg copackaged tablets (EUA); Take 3 tablets by mouth 2 (two) times daily. Each dose contains 2 nirmatrelvir (pink tablets) and 1 ritonavir (white tablet). Take all 3 tablets together  Dispense: 30 tablet; Refill: 0  -     benzonatate (TESSALON) 200 MG capsule; Take 1 capsule (200 mg total) by mouth 3 (three) times daily as needed for Cough.  Dispense: 30 capsule; Refill: 0  - Pt was positive for COVID 19 on Sat. When sx's started. The Sx's  have progressively worsened.  Will Rx Paxlovid - discussed potentil S.E. diarrhea. Reviewed med list - no interactions with his monoxodil or singulair  We discussed possibility of rebound. He will monitor pulse ox  rc use of inhaler.  Will rx tessalon perles which have helped in past. Pt will alert MD for any worsening of Sx's.   Rec supportive care.  Rec strict hand hygiene.  Rec Flonase 2 SEN/d and cam add 2nd gen antihistamine if needed such as claritin or zyrtec. Rec APAP/Ibuprofen prn T > 100.3/pain. RTC or alert MD if  Symptoms persist/worsen. We discussed current CDC Gl's for quarantine.          Chronic conditions status updated as per HPI.  Other than changes above, cont current medications and maintain follow up with specialists.    Follow up in about 16 weeks (around 9/9/2024) for well visit or sooner if needed.      Lizzie Talavera MD  [facility]                  Answers submitted by the patient for this visit:  Cough Questionnaire (Submitted on 5/20/2024)  Chief Complaint: Cough  Chronicity: new  Onset: in the past 7 days  Progression since onset: unchanged  Frequency: every few minutes  Cough characteristics: productive of brown sputum  ear congestion: Yes  heartburn: No  hemoptysis: No  nasal congestion: Yes  sweats: Yes  weight loss: No  Aggravated by: cold air, exercise, lying down  Risk factors for lung disease: smoking/tobacco exposure  asthma: Yes  bronchiectasis: No  bronchitis: No  COPD: No  emphysema: No  pneumonia: Yes  Treatments tried: OTC cough suppressant, body position changes, leukotriene antagonists, rest  Improvement on treatment: mild

## 2024-05-29 DIAGNOSIS — Q24.9 CONGENITAL MALFORMATION OF HEART, UNSPECIFIED: Primary | ICD-10-CM

## 2024-05-30 ENCOUNTER — TELEPHONE (OUTPATIENT)
Dept: CARDIOLOGY | Facility: CLINIC | Age: 49
End: 2024-05-30
Payer: COMMERCIAL

## 2024-05-30 DIAGNOSIS — Q24.9 CONGENITAL MALFORMATION OF HEART, UNSPECIFIED: Primary | ICD-10-CM

## 2024-05-30 DIAGNOSIS — I77.89 AORTIC ROOT ENLARGEMENT: ICD-10-CM

## 2024-06-07 ENCOUNTER — HOSPITAL ENCOUNTER (OUTPATIENT)
Dept: RADIOLOGY | Facility: HOSPITAL | Age: 49
Discharge: HOME | End: 2024-06-07
Attending: INTERNAL MEDICINE
Payer: COMMERCIAL

## 2024-06-07 DIAGNOSIS — E04.2 MULTINODULAR GOITER: ICD-10-CM

## 2024-06-07 PROCEDURE — 76536 US EXAM OF HEAD AND NECK: CPT | Mod: TC

## 2024-06-07 PROCEDURE — 76536 US EXAM OF HEAD AND NECK: CPT | Mod: 26,,, | Performed by: RADIOLOGY

## 2024-06-10 ENCOUNTER — PATIENT MESSAGE (OUTPATIENT)
Dept: PRIMARY CARE CLINIC | Facility: CLINIC | Age: 49
End: 2024-06-10
Payer: COMMERCIAL

## 2024-06-10 NOTE — PROGRESS NOTES
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  There are a few stable nodules including the largest nodule which appears similar ins size at 2.7 cm and was the nodule we previously had biopsied.  There is another nodule that has increased in size and so we will repeat another thyroid Ultrasound in 1 yr  Dr. HUNT

## 2024-08-14 ENCOUNTER — TELEPHONE (OUTPATIENT)
Dept: CARDIOLOGY | Facility: HOSPITAL | Age: 49
End: 2024-08-14
Payer: COMMERCIAL

## 2024-08-14 ENCOUNTER — PATIENT MESSAGE (OUTPATIENT)
Dept: CARDIOLOGY | Facility: HOSPITAL | Age: 49
End: 2024-08-14
Payer: COMMERCIAL

## 2024-08-14 NOTE — TELEPHONE ENCOUNTER
Calling regarding your upcoming Cardiac MRI scheduled for 08/15/2024 at 10:00. For return call I can be reached at [phone], M-F 7:30-4. Thank you!

## 2024-08-14 NOTE — TELEPHONE ENCOUNTER
I had the pleasure of discussing your upcoming Cardiac MRI scheduled for 08/14/2024 @ 10:00 at [facility] at [address] [facility] [address] across [address] from the [facility].     We discussed and ensured that you will arrive for the scheduled appointment and confirmed the absence of a pacemaker/defibrillator, the absence of a cerebral aneurysm or surgical clip, pump, nerve or brain stimulator, middle or inner ear prosthesis or other metal implant or being injured by a metal object (i.e. bullet, bb, shrapnel).    The test should take about 40-50 minutes to complete. It is important to hold still for the exam or the pictures will be unreadable. If you are claustrophobic or have pain issues that may interfere with your ability to stay still, please discuss this with the ordering provider. You may or may not receive IV gadolinium during the exam if this is indicated, so an IV will be placed. Your heart rate, blood pressure, and oxygen saturation will be continuously monitored throughout the exam. Rescue medications will be on hand in the event of any issues.      Thank you again for your time today. I can be reached at [phone], M-F from 7:30-4.

## 2024-08-14 NOTE — TELEPHONE ENCOUNTER
Calling regarding your upcoming Cardiac MRI scheduled for 08/15/2024 at 10:00. For return call I can be reached at [phone], M-F 7:30-4. Thank you!

## 2024-08-15 ENCOUNTER — HOSPITAL ENCOUNTER (OUTPATIENT)
Dept: RADIOLOGY | Facility: HOSPITAL | Age: 49
Discharge: HOME | End: 2024-08-15
Attending: INTERNAL MEDICINE
Payer: COMMERCIAL

## 2024-08-15 DIAGNOSIS — Q24.9 CONGENITAL MALFORMATION OF HEART, UNSPECIFIED: ICD-10-CM

## 2024-08-15 DIAGNOSIS — Q21.9 CONGENITAL MALFORMATION OF CARDIAC SEPTUM, UNSPECIFIED: ICD-10-CM

## 2024-08-15 DIAGNOSIS — I77.89 AORTIC ROOT ENLARGEMENT: ICD-10-CM

## 2024-08-15 PROCEDURE — A9585 GADOBUTROL INJECTION: HCPCS | Performed by: INTERNAL MEDICINE

## 2024-08-15 PROCEDURE — 75561 CARDIAC MRI FOR MORPH W/DYE: CPT | Mod: TC

## 2024-08-15 PROCEDURE — 25500020 PHARM REV CODE 255: Performed by: INTERNAL MEDICINE

## 2024-08-15 RX ORDER — GADOBUTROL 604.72 MG/ML
9 INJECTION INTRAVENOUS
Status: COMPLETED | OUTPATIENT
Start: 2024-08-15 | End: 2024-08-15

## 2024-08-15 RX ADMIN — GADOBUTROL 9 ML: 604.72 INJECTION INTRAVENOUS at 10:08

## 2024-08-21 ENCOUNTER — PATIENT MESSAGE (OUTPATIENT)
Dept: PRIMARY CARE CLINIC | Facility: CLINIC | Age: 49
End: 2024-08-21
Payer: COMMERCIAL

## 2024-08-21 NOTE — TELEPHONE ENCOUNTER
LOV 5/20/24  Annual   RTC 9/9/24    Patient is requesting you to review his recent MRI ordered by another provider.

## 2024-08-21 NOTE — TELEPHONE ENCOUNTER
This is mildly enlarged. Sometimes a viral illness like Mono can cause spleen enlargement.  Was he sick with sore throat lately?  Dr. HUNT

## 2024-08-30 ENCOUNTER — TELEPHONE (OUTPATIENT)
Dept: CARDIOLOGY | Facility: CLINIC | Age: 49
End: 2024-08-30
Payer: COMMERCIAL

## 2024-09-05 ENCOUNTER — HOSPITAL ENCOUNTER (OUTPATIENT)
Dept: RADIOLOGY | Facility: HOSPITAL | Age: 49
Discharge: HOME | End: 2024-09-05
Attending: INTERNAL MEDICINE
Payer: COMMERCIAL

## 2024-09-05 DIAGNOSIS — D18.03 HEPATIC HEMANGIOMA: ICD-10-CM

## 2024-09-05 PROCEDURE — 76705 ECHO EXAM OF ABDOMEN: CPT | Mod: 26,,, | Performed by: RADIOLOGY

## 2024-09-05 PROCEDURE — 76705 ECHO EXAM OF ABDOMEN: CPT | Mod: TC

## 2024-09-06 NOTE — PROGRESS NOTES
I sent pt a my chart message -  Tu Cardoso,   I reviewed your Abdominal Ultrasound. It showed   an area of increased echogenicity within the right lobe of liver measuring up to 6.3 cm, previously characterized as hemangioma on CTA  abdomen 05/31/2023.  The radiologist compared it to the previous CT where it measured 5.5 cm.   The slight differences in measurements is likely secondary to technique. No further follow-up is necessary.  Gallbladder wall polyps measuring up to 3 mm.  No sonographic evidence of cholecystitis.  Your spleen was Upper limits of normal in size.  Enjoy your upcoming Bday!   Dr. HUNT

## 2024-09-08 NOTE — PROGRESS NOTES
[facility] Note    Chief Complaint      Chief Complaint   Patient presents with    Annual Exam       History of Present Illness      Willy Bueno is a 48 y.o.  WM with Psoriasis, Hypertriglyceridemia, Nicotine dependence in remission presents to recent issues. Referred by wife. 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  Last  virtual visit - 5/20/24     Liver hemangioma - Abd U/S - 9/5/24 -  area of increased echogenicity within the right lobe of liver measuring up to 6.3 cm, previously characterized as hemangioma on CTA  abdomen 05/31/2023.  The radiologist compared it to the previous CT where it measured 5.5 cm.   The slight differences in measurements is likely secondary to technique. No further follow-up is necessary.Gallbladder wall polyps measuring up to 3 mm.  No sonographic evidence of cholecystitis.  His spleen was Upper limits of normal in size.    Alopecia - Pt on Minoxodil 1.25 mg/d.     Lipomatous hypertrophy of the interatrial septum  -Echo - 5/19/23 - Normal EF -65%. Trace MR, mild Tricuspid regurgitation.There appears to be lipomatous hypertrophy of the interatrial septum.   Sinuses of Valsalva mildly dilated.  Fu with Dr. Hicks . CTA chest - 5/23/23 - no evidence of a pulmonary embolism. Thoracic aorta normal caliber. Trace amount of scattered interstitial thickening Incidentally it also showed a Mildly enlarged left thyroid lobe with substernal extension. Splenomegaly measuring 14.3 cm. CTA CAP - 5/31/23 - No findings to suggest aortic aneurysm or dissection.No pulmonary thromboembolism. Right hepatic lobe lesion suggests hemangioma, smaller lesion within the hepatic dome may reflect flash filling hemangioma although incompletely characterized. Repeat Echo - 5/14/24  -- EF 65%; Aortic root is mildly dilated measuring 3.9 cm. MRI Cardiac - 8/15/24 -  Cardiovascular findings are to be reported separately by the cardiology department. Large right hepatic lobe cavernous hemangioma, similar  to prior study dated 05/31/2023. Splenomegaly 13.7 cm.    MNG - Fu with Dr. Monique. FNA - 6/5/23 - benign.Thyroid U/S - 6/7/24- MNG - There are a few stable nodules including the largest nodule which appears similar ins size at 2.7 cm and was the nodule we previously had biopsied.  There is another nodule that has increased in size and so we will repeat another thyroid Ultrasound in 1 yr      Vit B12 def - B12 386 - 5/2/23. Cont. Vit B12 1000 mcg/d. Repeat B12 with next labs.      Vit D def - Vit D 24. Cont.  Vit D3 1000 u/d.      HTG -Improved 172.  It has been as high as 400 in past.      ADD - d/x ADD in 1997 or 1998. He was on medication in college but stopped taking it around 2003. Rec formal testing so can Rx pharmacotherapy if needed in future.      Psoriasis - Pt on topicals per Mary, Dr. Nathaniel Prado     GERD - EGD - Grade I esophagitis, Gastritis, Duodenitis.  Rec reflux prec.Takes Pepcid prn.      Asthma -  +h/o Exercise induced asthma in teens. He reports worsening of Sx's after recently cutting the grass last wk. ?allergic triggers. Uses albuterol prn. Singulair has helped.   Rec consider PCV 20.      Colon Polyps - repeat 2/10/23 -  C-scope 3 yrs.     BPH - noted on recent pelvic u/s with . Pt asymptomatic. PSA - wnl.  Alert MD for any Sx's/concerns.      Fam h/o Bicuspid aortic valve and aneurysm - Echo - 5/14/24  -- EF 65%; Aortic root is mildly dilated measuring 3.9 cm.  Fu by Cards.      H/o Flank pain - Fu with , Dr rCaven. Renal U/S - Stable 6.1 cm Right hepatic heterogeneous mass favored to represent a hemangioma.   KUB - 2/5/24 - Moderate amount of fecal matter scattered through the colon. Pain was felt to be due to possible bladder spasms.      HCM - Flu - 9/23/23;  Tdap - 10/17/18; PCV 20 - none- defers; Shingrix - due at 52 y.o.;  COVID -19 -Vaccine 1/12/21; # 2 2/3/21; # 3 12/27/21; #4 12/27/22;  Hep C Screen - 8/4/22 - neg.; HIV Screen - utd - neg.; C-scope - 2/10/23- polyps - repeat  3 yrs;  PSA - 1.97 - 12/13/23; Prev PCP - Dr. Elaine; KEN - Dr. Campbell; Ortho - Dr. Suárez; Derm - Dr. Prado; Ophtho -plans to see Dr. Jones; GI - Dr. Flowers/Dr. Goodson; KEN - Dr. Campbell; Well visit - 9/9/24     Patient Care Team:  Lizzie Talavera MD as PCP - General (Internal Medicine)  Lisette Altamirano MA (Inactive) as Care Coordinator     Health Maintenance:  Immunization History   Administered Date(s) Administered    COVID-19, MRNA, LN-S, PF (Pfizer) (Purple Cap) 01/12/2021, 02/03/2021, 12/27/2021    COVID-19, mRNA, LNP-S, bivalent booster, PF (PFIZER OMICRON) 12/27/2022    Influenza (FLUBLOK) - Quadrivalent - Recombinant - PF *Preferred* (egg allergy) 10/01/2020    Influenza - Quadrivalent 10/10/2019    Influenza - Quadrivalent - MDCK - PF 09/23/2023    Influenza - Quadrivalent - PF *Preferred* (6 months and older) 02/11/2019, 10/10/2019, 10/08/2021, 10/17/2022    Tdap 10/17/2018      Health Maintenance   Topic Date Due    Colorectal Cancer Screening  02/10/2026    TETANUS VACCINE  10/17/2028    Lipid Panel  12/22/2028    Hepatitis C Screening  Completed        Past Medical History:  Past Medical History:   Diagnosis Date    COVID-19     9/2021; 7/25/22    Dislocated shoulder, left, sequela     x 2     Dislocated shoulder, right, sequela     Jaw fracture     Leg fracture     in childhood     Psoriasis     Right foot pain 8/24/2022    Supraspinatus tendon tear     left    Wrist fracture, closed        Past Surgical History:   has a past surgical history that includes Lipoma resection (1996) and Mole removal.    Family History:  family history includes Alzheimer's disease in his maternal grandmother; Alzheimer's disease (age of onset: 53) in his maternal aunt; Congenital heart disease in his father; Depression in his mother; Early death in his maternal aunt; Heart disease in his father. He was adopted.     Social History:  Social History     Tobacco Use    Smoking status: Former     Current  packs/day: 0.00     Average packs/day: 1 pack/day for 22.0 years (22.0 ttl pk-yrs)     Types: Cigarettes     Start date:      Quit date: 2018     Years since quittin.6    Smokeless tobacco: Never   Substance Use Topics    Alcohol use: Yes     Alcohol/week: 1.0 standard drink of alcohol     Types: 1 Cans of beer per week     Comment: rare    Drug use: Never       Review of Systems   Constitutional:  Positive for fatigue. Negative for chills, fever and night sweats.        Wakes with 1 y.o. who is teething.   HENT:  Negative for hearing loss.    Eyes:  Positive for visual disturbance.        Uses readers and Rx glasses.    Respiratory:  Negative for apnea, chest tightness and shortness of breath.         Slight snoring.    Cardiovascular:  Positive for chest pain. Negative for palpitations and leg swelling.        4 days ago in AM - started btwn shoulder blades and radiated forward - worse with certain mvmnts. Has since resolved.    Gastrointestinal:  Negative for abdominal pain, blood in stool, constipation, diarrhea, nausea and vomiting.        Had Gi issues 2 wks ago. Urgent Bm this AM. No mucus in stools. 1 y.o. daughter is in day care and started with it and then he and his daughter and his wife got Sx's.    Endocrine: Positive for polydipsia. Negative for cold intolerance and heat intolerance.   Genitourinary:  Negative for dysuria, frequency and hematuria.   Musculoskeletal:  Negative for arthralgias and myalgias.   Neurological:  Negative for dizziness and headaches.   Psychiatric/Behavioral:  Positive for depressed mood. The patient is not nervous/anxious.         Anxiety is less. He switched jobs in end of May and this is a better work environment but has had a paycut which has been stressful but work life balance is better.         Medications:    Current Outpatient Medications:     calcium carbonate/vitamin D3 (VITAMIN D-3 ORAL), Take 1 capsule by mouth every Mon, Wed, Fri., Disp: , Rfl:      "cyanocobalamin, vitamin B-12, (VITAMIN B-12 ORAL), Take 5,000 mcg by mouth once daily., Disp: , Rfl:     famotidine (PEPCID) 20 MG tablet, Take 20 mg by mouth daily as needed for Heartburn., Disp: , Rfl:     minoxidiL (LONITEN) 2.5 MG tablet, Take 2.5 mg by mouth once daily., Disp: , Rfl:     montelukast (SINGULAIR) 10 mg tablet, TAKE 1 TABLET(10 MG) BY MOUTH EVERY EVENING, Disp: 90 tablet, Rfl: 3    multivitamin (THERAGRAN) per tablet, Take 1 tablet by mouth once daily., Disp: , Rfl:     triamcinolone acetonide 0.1% (KENALOG) 0.1 % ointment, Apply topically. Uses prn. Unsure of dosage, Disp: , Rfl:     albuterol (PROVENTIL/VENTOLIN HFA) 90 mcg/actuation inhaler, Inhale 2 puffs into the lungs every 6 (six) hours as needed for Wheezing or Shortness of Breath. Rescue, Disp: 18 g, Rfl: 3     Allergies:  Review of patient's allergies indicates:   Allergen Reactions    Bactrim [sulfamethoxazole-trimethoprim] Blisters    Doxycycline Nausea Only       Physical Exam      Vital Signs  Temp: 97.7 °F (36.5 °C)  Pulse: 71  Resp: 16  SpO2: 97 %  BP: 110/82  BP Location: Left arm  Patient Position: Sitting  Pain Score: 0-No pain  Height and Weight  Height: 5' 8" (172.7 cm)  Weight: 81.6 kg (179 lb 14.3 oz)  BSA (Calculated - sq m): 1.98 sq meters  BMI (Calculated): 27.4  Weight in (lb) to have BMI = 25: 164.1      Patient Position: Sitting      Physical Exam  Vitals reviewed.   Constitutional:       General: He is not in acute distress.     Appearance: Normal appearance. He is not ill-appearing, toxic-appearing or diaphoretic.   HENT:      Head: Normocephalic and atraumatic.      Right Ear: Tympanic membrane normal.      Left Ear: Tympanic membrane normal.      Mouth/Throat:      Mouth: Mucous membranes are moist.      Pharynx: No oropharyngeal exudate or posterior oropharyngeal erythema.   Eyes:      Extraocular Movements: Extraocular movements intact.      Conjunctiva/sclera: Conjunctivae normal.      Pupils: Pupils are equal, " round, and reactive to light.   Neck:      Vascular: No carotid bruit.   Cardiovascular:      Rate and Rhythm: Normal rate and regular rhythm.      Pulses: Normal pulses.      Heart sounds: Normal heart sounds.   Pulmonary:      Effort: No respiratory distress.      Breath sounds: Normal breath sounds. No wheezing.   Abdominal:      General: Bowel sounds are normal. There is no distension.      Palpations: Abdomen is soft.      Tenderness: There is no abdominal tenderness. There is no guarding or rebound.   Musculoskeletal:         General: Normal range of motion.      Cervical back: Neck supple. No tenderness.   Skin:     General: Skin is warm and dry.   Neurological:      General: No focal deficit present.      Mental Status: He is alert and oriented to person, place, and time.   Psychiatric:         Mood and Affect: Mood normal.         Behavior: Behavior normal.          Laboratory:  CBC:  Recent Labs   Lab 08/24/22  0843 12/22/23  0849   WBC 6.54 6.6   RBC 5.19 5.35   Hemoglobin 15.3 15.7   Hematocrit 45.4 46.8   Platelets 141 L 195   MCV 88 87.5   MCH 29.5 29.3   MCHC 33.7 33.5       CMP:  Recent Labs   Lab 08/24/22  0843 12/22/23  0849   Glucose 94 93   Calcium 9.1 9.5   Albumin 4.1 4.5   Total Protein 6.5 6.7   Sodium 141 141   Potassium 4.0 4.3   CO2 27 29   Chloride 107 104   BUN 13 18   Creatinine 0.8 0.85   Alkaline Phosphatase 77  --    ALT 22 19   AST 15 17   Total Bilirubin 0.9 0.9           URINALYSIS:  Recent Labs   Lab 03/26/24  2224   Color, UA Yellow   Specific Gravity, UA 1.025   pH, UA 6.0   Protein, UA Trace A   Nitrite, UA Negative   Leukocytes, UA Negative        LIPIDS:  Recent Labs   Lab 08/24/22  0843 12/22/23  0849   TSH 0.839 0.45   HDL 35 L 44   Cholesterol 169 168   Triglycerides 174 H 172 H   LDL Cholesterol 99.2 97   HDL/Cholesterol Ratio 20.7 3.8   Non-HDL Cholesterol 134  --    Non HDL Chol. (LDL+VLDL)  --  124   Total Cholesterol/HDL Ratio 4.8  --        TSH:  Recent Labs   Lab  08/24/22  0843 12/22/23  0849   TSH 0.839 0.45            Recent Labs   Lab 08/24/22  0843 12/22/23  0849   PSADIAG  --  1.97   Hepatitis C Ab Negative  --        Assessment/Plan     Willy Bueno is a 48 y.o.male with:    Normal physical exam, routine  -     CBC Auto Differential; Future; Expected date: 12/23/2024  -     Comprehensive Metabolic Panel; Future; Expected date: 12/23/2024  -     Hemoglobin A1C; Future; Expected date: 12/23/2024  -     TSH; Future; Expected date: 12/23/2024  - Performed today.  Will check Basic labs.  RTC in 1 yr for fu or sooner if needed     Multinodular goiter  -     US Soft Tissue Head Neck; Future; Expected date: 06/08/2025  - Repeat U/S    Vitamin B12 deficiency  -     Vitamin B12; Future; Expected date: 12/23/2024  - Stable. Cotn current. Repeat B12 with next labs.     Mild intermittent asthma without complication  - Stable.  Cont current regimen.    Hepatic hemangioma  - Stable.     Prostate cancer screening  -     PSA, Screening; Future; Expected date: 12/23/2024    Screening for lipoid disorders  -     Lipid Panel; Future; Expected date: 12/23/2024         Chronic conditions status updated as per HPI.  Other than changes above, cont current medications and maintain follow up with specialists.   Follow up in about 1 year (around 9/10/2025) for well visit or sooner if needed.      Lizzie Talavera MD  [facility]

## 2024-09-09 ENCOUNTER — OFFICE VISIT (OUTPATIENT)
Dept: PRIMARY CARE CLINIC | Facility: CLINIC | Age: 49
End: 2024-09-09
Payer: COMMERCIAL

## 2024-09-09 ENCOUNTER — TELEPHONE (OUTPATIENT)
Dept: PRIMARY CARE CLINIC | Facility: CLINIC | Age: 49
End: 2024-09-09
Payer: COMMERCIAL

## 2024-09-09 VITALS
SYSTOLIC BLOOD PRESSURE: 110 MMHG | HEART RATE: 71 BPM | WEIGHT: 179.88 LBS | TEMPERATURE: 98 F | RESPIRATION RATE: 16 BRPM | BODY MASS INDEX: 27.26 KG/M2 | DIASTOLIC BLOOD PRESSURE: 82 MMHG | OXYGEN SATURATION: 97 % | HEIGHT: 68 IN

## 2024-09-09 DIAGNOSIS — D18.03 HEPATIC HEMANGIOMA: ICD-10-CM

## 2024-09-09 DIAGNOSIS — E53.8 VITAMIN B12 DEFICIENCY: ICD-10-CM

## 2024-09-09 DIAGNOSIS — Z13.220 SCREENING FOR LIPOID DISORDERS: ICD-10-CM

## 2024-09-09 DIAGNOSIS — Z12.5 PROSTATE CANCER SCREENING: ICD-10-CM

## 2024-09-09 DIAGNOSIS — Z00.00 NORMAL PHYSICAL EXAM, ROUTINE: Primary | ICD-10-CM

## 2024-09-09 DIAGNOSIS — J45.20 MILD INTERMITTENT ASTHMA WITHOUT COMPLICATION: ICD-10-CM

## 2024-09-09 DIAGNOSIS — E04.2 MULTINODULAR GOITER: ICD-10-CM

## 2024-09-09 PROCEDURE — 3074F SYST BP LT 130 MM HG: CPT | Mod: CPTII,S$GLB,, | Performed by: INTERNAL MEDICINE

## 2024-09-09 PROCEDURE — 3008F BODY MASS INDEX DOCD: CPT | Mod: CPTII,S$GLB,, | Performed by: INTERNAL MEDICINE

## 2024-09-09 PROCEDURE — 1159F MED LIST DOCD IN RCRD: CPT | Mod: CPTII,S$GLB,, | Performed by: INTERNAL MEDICINE

## 2024-09-09 PROCEDURE — 1160F RVW MEDS BY RX/DR IN RCRD: CPT | Mod: CPTII,S$GLB,, | Performed by: INTERNAL MEDICINE

## 2024-09-09 PROCEDURE — 3079F DIAST BP 80-89 MM HG: CPT | Mod: CPTII,S$GLB,, | Performed by: INTERNAL MEDICINE

## 2024-09-09 PROCEDURE — 99396 PREV VISIT EST AGE 40-64: CPT | Mod: S$GLB,,, | Performed by: INTERNAL MEDICINE

## 2024-09-09 PROCEDURE — 99999 PR PBB SHADOW E&M-EST. PATIENT-LVL IV: CPT | Mod: PBBFAC,,, | Performed by: INTERNAL MEDICINE

## 2024-09-09 NOTE — TELEPHONE ENCOUNTER
----- Message from Lizzie Talavera MD sent at 9/9/2024  1:06 PM CDT -----  Pt has an annual sched for 9/30/24 but saw me today  Dr. HUNT

## 2024-11-07 ENCOUNTER — OFFICE VISIT (OUTPATIENT)
Dept: CARDIOLOGY | Facility: CLINIC | Age: 49
End: 2024-11-07
Payer: COMMERCIAL

## 2024-11-07 VITALS
HEIGHT: 68 IN | BODY MASS INDEX: 26.63 KG/M2 | HEART RATE: 63 BPM | SYSTOLIC BLOOD PRESSURE: 121 MMHG | WEIGHT: 175.69 LBS | DIASTOLIC BLOOD PRESSURE: 84 MMHG

## 2024-11-07 DIAGNOSIS — R93.1 ABNORMAL ECHOCARDIOGRAM: ICD-10-CM

## 2024-11-07 DIAGNOSIS — I77.89 AORTIC ROOT ENLARGEMENT: Primary | ICD-10-CM

## 2024-11-07 PROCEDURE — 3074F SYST BP LT 130 MM HG: CPT | Mod: CPTII,S$GLB,, | Performed by: INTERNAL MEDICINE

## 2024-11-07 PROCEDURE — 1159F MED LIST DOCD IN RCRD: CPT | Mod: CPTII,S$GLB,, | Performed by: INTERNAL MEDICINE

## 2024-11-07 PROCEDURE — 99999 PR PBB SHADOW E&M-EST. PATIENT-LVL III: CPT | Mod: PBBFAC,,, | Performed by: INTERNAL MEDICINE

## 2024-11-07 PROCEDURE — 3079F DIAST BP 80-89 MM HG: CPT | Mod: CPTII,S$GLB,, | Performed by: INTERNAL MEDICINE

## 2024-11-07 PROCEDURE — 99215 OFFICE O/P EST HI 40 MIN: CPT | Mod: S$GLB,,, | Performed by: INTERNAL MEDICINE

## 2024-11-07 PROCEDURE — 3008F BODY MASS INDEX DOCD: CPT | Mod: CPTII,S$GLB,, | Performed by: INTERNAL MEDICINE

## 2024-11-07 RX ORDER — ONDANSETRON 4 MG/1
4 TABLET, ORALLY DISINTEGRATING ORAL EVERY 4 HOURS PRN
COMMUNITY
Start: 2024-10-24

## 2024-11-07 NOTE — PROGRESS NOTES
[facility]      Chief Complaint   Patient presents with    Abnormal echocardiogram       Patient ID: Willy Bueno is a 49 y.o. male with overweight, former smoker, who presents for a follow up  appointment.  Pertinent history/events are as follows:     -Pt kindly referred by Dr. Talavera for evaluation of enlarged aorta.     - At clinic visit on  5/25/2023 Mr. Bueno is accompanied by his wife.  He reports no chest pain or SOB.  He reports family history of bicuspid aortic valve in his father.  Formerly smoked a pack a day for 22 years.  Quit in 2018.  Echo on 6/19/2023 demonstrated EF 65%; normal left ventricular diastolic function; normal right ventricular size with normal right ventricular systolic function; mild tricuspid regurgitation; intermediate central venous pressure (8 mmHg); estimated PA systolic pressure is 30 mmHg; the sinuses of Valsalva is mildly dilated; there appears to be lipomatous hypertrophy of the interatrial septum.  CTA Chest on 5/23/2023 is unremarkable.    Plan:  Enlarged Ascending Aorta- Echo shows mild enlargement of ascending aorta.  There appears to be lipomatous hypertrophy of the interatrial septum.  No aortic insufficiency.  CTA chest is unremarkable.  Given family history of bicuspid aortic valve in his father, will check CTA chest/abd/pelvis to rule out aortopathy.  Repeat echo in 1 year.    Overweight- Encourage diet, exercise, and weight loss.     -At follow up clinic visit on 8/9/2023, Mr. Bueno reported no chest pain, palpitations, light headedness or SOB. H reports blood pressure is 135/85 at home most of the time. Echo on 5/19/2023 revealed normal systolic function with EF of 65%, Normal left ventricular diastolic function. PA systolic pressure is 30 mmHg. Sinuses of Valsalva is mildly dilated. CTA Chest/Abdomen/Pelvis on 5/31/2023 revealed 3 vessel arch.  The proximal arch vessels are patent.  The thoracic aorta tapers normally.  The celiac axis,  SMA, bilateral renal arteries, bilateral accessory renal arteries, DELLA, and distal aorto iliac branches all are patent.  No findings to suggest aneurysm or dissection. No pulmonary thromboembolism. Right hepatic lobe lesion suggests hemangioma, smaller lesion within the hepatic dome may reflect flash filling hemangioma although incompletely characterized.  Plan:   Enlarged Ascending Aorta- Echo shows mild enlargement of ascending aorta.  There appears to be lipomatous hypertrophy of the interatrial septum.  No aortic insufficiency.  CTA chest is unremarkable.  Given family history of bicuspid aortic valve in his father. Echo on 5/19/2023 revealed normal systolic function with EF of 65%, Normal left ventricular diastolic function. PA systolic pressure is 30 mmHg. Sinuses of Valsalva is mildly dilated. CTA Chest/Abdomen/Pelvis on 5/31/2023 revealed 3 vessel arch. The proximal arch vessels are patent.  The thoracic aorta tapers normally.  The celiac axis, SMA, bilateral renal arteries, bilateral accessory renal arteries, DELLA, and distal aorto iliac branches all are patent.  No findings to suggest aneurysm or dissection. No pulmonary thromboembolism. Right hepatic lobe lesion suggests hemangioma, smaller lesion within the hepatic dome may reflect flash filling hemangioma although incompletely characterized.  Repeat echo in 1 year.    Overweight- Encourage diet, exercise, and weight loss.   High blood pressure: Blood pressure in 135/85 at home. Pt to limit sodium 2000 mg/day. Patient to keep a log of blood pressure and heart rate at home and bring in next clinic visit. Report to the clinic if blood pressure greater than 140 or less than 100 consistently or experience light headedness/syncope related symptoms. LDL 92 on 8/24/22.    5/1/2024 clinic visit: Mr. Bueno reports doing well with no chest pain, palpitations, light headedness or SOB.   Plan:  Enlarged Ascending Aorta- Echo shows mild enlargement of ascending  aorta.  There appears to be lipomatous hypertrophy of the interatrial septum.  No aortic insufficiency.  CTA chest is unremarkable.  Given family history of bicuspid aortic valve in his father. Echo on 5/19/2023 revealed normal systolic function with EF of 65%, Normal left ventricular diastolic function. PA systolic pressure is 30 mmHg. Sinuses of Valsalva is mildly dilated. CTA Chest/Abdomen/Pelvis on 5/31/2023 revealed 3 vessel arch. The proximal arch vessels are patent.  The thoracic aorta tapers normally.  The celiac axis, SMA, bilateral renal arteries, bilateral accessory renal arteries, DELLA, and distal aorto iliac branches all are patent.  No findings to suggest aneurysm or dissection. No pulmonary thromboembolism. Right hepatic lobe lesion suggests hemangioma, smaller lesion within the hepatic dome may reflect flash filling hemangioma although incompletely characterized.  Repeat echo in 1 year (5/2024).    Overweight- Encourage diet, exercise, and weight loss.   Elevated Blood Pressure- Controlled.  Continue to monitor.    ASCVD- The 10-year ASCVD risk score ([place] DK, et al., 2019) is: 2%.  Encourage diet, exercise, and weight loss.     HPI:  Mr. Bueno reports no new issues.  He has no chest pain, palpitations, light headedness or SOB. Cardiac MRI on 8/15/64646 revealed the aortic root measures upper normal to mildly dilated in dimension; 36 x 36 x 39 mm (z score: 1.8 x 1.8 x 2.5). The ascending aorta measures upper normal to mildly dilated in dimension; 32 x 33 mm (z score: 1.9 x 2.2)    Past Medical History:   Diagnosis Date    COVID-19     9/2021; 7/25/22    Dislocated shoulder, left, sequela     x 2     Dislocated shoulder, right, sequela     Jaw fracture     Leg fracture     in childhood     Psoriasis     Right foot pain 8/24/2022    Supraspinatus tendon tear     left    Wrist fracture, closed      Past Surgical History:   Procedure Laterality Date    LIPOMA RESECTION  1996    MOLE REMOVAL        Social History     Socioeconomic History    Marital status:    Tobacco Use    Smoking status: Former     Current packs/day: 0.00     Average packs/day: 1 pack/day for 22.0 years (22.0 ttl pk-yrs)     Types: Cigarettes     Start date:      Quit date: 2018     Years since quittin.8    Smokeless tobacco: Never   Substance and Sexual Activity    Alcohol use: Yes     Alcohol/week: 1.0 standard drink of alcohol     Types: 1 Cans of beer per week     Comment: rare    Drug use: Never    Sexual activity: Yes     Partners: Female     Birth control/protection: None     Social Drivers of Health     Financial Resource Strain: Low Risk  (3/28/2024)    Overall Financial Resource Strain (CARDIA)     Difficulty of Paying Living Expenses: Not hard at all   Food Insecurity: No Food Insecurity (3/28/2024)    Hunger Vital Sign     Worried About Running Out of Food in the Last Year: Never true     Ran Out of Food in the Last Year: Never true   Transportation Needs: No Transportation Needs (3/28/2024)    PRAPARE - Transportation     Lack of Transportation (Medical): No     Lack of Transportation (Non-Medical): No   Physical Activity: Sufficiently Active (3/28/2024)    Exercise Vital Sign     Days of Exercise per Week: 7 days     Minutes of Exercise per Session: 30 min   Stress: Stress Concern Present (3/28/2024)    [employer] [place] [employer] - Occupational Stress Questionnaire     Feeling of Stress : Rather much   Housing Stability: Low Risk  (3/28/2024)    Housing Stability Vital Sign     Unable to Pay for Housing in the Last Year: No     Number of Places Lived in the Last Year: 1     Unstable Housing in the Last Year: No     Family History   Adopted: Yes   Problem Relation Name Age of Onset    Congenital heart disease Father Chris Jackson         bicuspicd aortic valve    Heart disease Father Chris Jackson     Alzheimer's disease Maternal Grandmother      Alzheimer's disease Maternal Aunt  53    Depression  "Mother Mitali Mccloud     Early death Maternal Aunt Unknown        Review of patient's allergies indicates:   Allergen Reactions    Bactrim [sulfamethoxazole-trimethoprim] Blisters    Doxycycline Nausea Only       Medication List with Changes/Refills   Current Medications    ALBUTEROL (PROVENTIL/VENTOLIN HFA) 90 MCG/ACTUATION INHALER    Inhale 2 puffs into the lungs every 6 (six) hours as needed for Wheezing or Shortness of Breath. Rescue    CALCIUM CARBONATE/VITAMIN D3 (VITAMIN D-3 ORAL)    Take 1 capsule by mouth every Mon, Wed, Fri.    CYANOCOBALAMIN, VITAMIN B-12, (VITAMIN B-12 ORAL)    Take 5,000 mcg by mouth once daily.    FAMOTIDINE (PEPCID) 20 MG TABLET    Take 20 mg by mouth daily as needed for Heartburn.    MINOXIDIL (LONITEN) 2.5 MG TABLET    Take 2.5 mg by mouth once daily.    MONTELUKAST (SINGULAIR) 10 MG TABLET    TAKE 1 TABLET(10 MG) BY MOUTH EVERY EVENING    MULTIVITAMIN (THERAGRAN) PER TABLET    Take 1 tablet by mouth once daily.    ONDANSETRON (ZOFRAN-ODT) 4 MG TBDL    Take 4 mg by mouth every 4 (four) hours as needed.    TRIAMCINOLONE ACETONIDE 0.1% (KENALOG) 0.1 % OINTMENT    Apply topically. Uses prn. Unsure of dosage       Review of Systems  Constitution: Denies chills, fever, and sweats.  HENT: Denies headaches or blurry vision.  Cardiovascular: Denies chest pain or irregular heart beat.  Respiratory: Denies cough or shortness of breath.  Gastrointestinal: Denies abdominal pain, nausea, or vomiting.  Musculoskeletal: Denies muscle cramps.  Neurological: Denies dizziness or focal weakness.  Psychiatric/Behavioral: Normal mental status.  Hematologic/Lymphatic: Denies bleeding problem or easy bruising/bleeding.  Skin: Denies rash or suspicious lesions    Physical Examination  /84 (BP Location: Left arm, Patient Position: Sitting)   Pulse 63   Ht 5' 8" (1.727 m)   Wt 79.7 kg (175 lb 11.3 oz)   BMI 26.72 kg/m²     Constitutional: No acute distress, conversant  HEENT: Sclera anicteric, " "Pupils equal, round and reactive to light, extraocular motions intact, Oropharynx clear  Neck: No JVD, no carotid bruits  Cardiovascular: regular rate and rhythm, no murmur, rubs or gallops, normal S1/S2  Pulmonary: Clear to auscultation bilaterally  Abdominal: Abdomen soft, nontender, nondistended, positive bowel sounds  Extremities: No lower extremity edema,   Pulses:  Carotid pulses are 2+ on the right side, and 2+ on the left side.  Radial pulses are 2+ on the right side, and 2+ on the left side.   Femoral pulses are 2+ on the right side, and 2+ on the left side.  Popliteal pulses are 2+ on the right side, and 2+ on the left side.   Dorsalis pedis pulses are 2+ on the right side, and 2+ on the left side.   Posterior tibial pulses are 2+ on the right side, and 2+ on the left side.    Skin: No ecchymosis, erythema, or ulcers  Psych: Alert and oriented x 3, appropriate affect  Neuro: CNII-XII intact, no focal deficits    Labs:  Most Recent Data  CBC:   Lab Results   Component Value Date    WBC 6.6 12/22/2023    HGB 15.7 12/22/2023    HCT 46.8 12/22/2023     12/22/2023    MCV 87.5 12/22/2023    RDW 12.4 12/22/2023     BMP:   Lab Results   Component Value Date     12/22/2023    K 4.3 12/22/2023     12/22/2023    CO2 29 12/22/2023    BUN 18 12/22/2023    CREATININE 0.85 12/22/2023    GLU 93 12/22/2023    CALCIUM 9.5 12/22/2023    MG 2.2 08/24/2022     LFTS;   Lab Results   Component Value Date    PROT 6.7 12/22/2023    ALBUMIN 4.5 12/22/2023    BILITOT 0.9 12/22/2023    AST 17 12/22/2023    ALKPHOS 77 08/24/2022    ALT 19 12/22/2023     COAGS: No results found for: "INR", "PROTIME", "PTT"  FLP:   Lab Results   Component Value Date    CHOL 168 12/22/2023    HDL 44 12/22/2023    LDLCALC 97 12/22/2023    TRIG 172 (H) 12/22/2023    CHOLHDL 3.8 12/22/2023     CARDIAC: No results found for: "TROPONINI", "CKTOTAL", "CKMB", "BNP"    Imaging:    Cardiac MRI 8/15/78339:  Impression:   The left ventricular " volumes are normal. There is no evidence of LV wall motion anomalies. The calculated LVEF is 55 %.   The right ventricle volumes are normal. The calculated RVEF is 54 %.  The aortic valve is structurally normal and trileaflet. No significant aortic valve regurgitation  The aortic root measures upper normal to mildly dilated in dimension; 36 x 36 x 39 mm (z score: 1.8 x 1.8 x 2.5).  The ascending aorta measures upper normal to mildly dilated in dimension; 32 x 33 mm (z score: 1.9 x 2.2)  The ST junction is normal in caliber. No coarctation of the aorta.  No prior cardiac MRI for comparison.    Echo 5/19/2023  Normal systolic function.  The estimated ejection fraction is 65%.  Normal left ventricular diastolic function.  Normal right ventricular size with normal right ventricular systolic function.  Mild tricuspid regurgitation.  Intermediate central venous pressure (8 mmHg).  The estimated PA systolic pressure is 30 mmHg.  The sinuses of Valsalva is mildly dilated.    CTA Chest/Abdomen/Pelvis 5/31/2023  There is a 3 vessel arch.  The proximal arch vessels are patent.  The thoracic aorta tapers normally.  The celiac axis, SMA, bilateral renal arteries, bilateral accessory renal arteries, DELLA, and distal aorto iliac branches all are patent.  No findings to suggest aneurysm or dissection.   No pulmonary thromboembolism.   Right hepatic lobe lesion suggests hemangioma, smaller lesion within the hepatic dome may reflect flash filling hemangioma although incompletely characterized.    EKG 8/24/2022:  Sinus bradycardia    CTA Chest 5/23/2023:  Heart size within normal limits.    No pericardial or pleural effusion.    Thoracic aorta normal caliber.    Mildly enlarged left thyroid lobe with substernal extension.  No lymphadenopathy.    Echo 6/19/2023:  Normal systolic function.  The estimated ejection fraction is 65%.  Normal left ventricular diastolic function.  Normal right ventricular size with normal right ventricular  systolic function.  Mild tricuspid regurgitation.  Intermediate central venous pressure (8 mmHg).  The estimated PA systolic pressure is 30 mmHg.  The sinuses of Valsalva is mildly dilated.    Assessment/Plan:  Willy Bueno is a 49 y.o. male with overweight, former smoker, who presents for a follow up appointment.    1. Enlarged Ascending Aorta- Echo shows mild enlargement of ascending aorta.  There appears to be lipomatous hypertrophy of the interatrial septum.  No aortic insufficiency.  CTA chest is unremarkable.  CTA Chest/Abdomen/Pelvis on 5/31/2023 revealed 3 vessel arch. The proximal arch vessels are patent.  The thoracic aorta tapers normally.  The celiac axis, SMA, bilateral renal arteries, bilateral accessory renal arteries, DELLA, and distal aorto iliac branches all are patent.  No findings to suggest aneurysm or dissection. No pulmonary thromboembolism. Right hepatic lobe lesion suggests hemangioma, smaller lesion within the hepatic dome may reflect flash filling hemangioma although incompletely characterized.  Cardiac MRI on 8/15/60059 revealed the aortic root measures upper normal to mildly dilated in dimension; 36 x 36 x 39 mm (z score: 1.8 x 1.8 x 2.5). The ascending aorta measures upper normal to mildly dilated in dimension; 32 x 33 mm (z score: 1.9 x 2.2).       2. Overweight- Encourage diet, exercise, and weight loss.     3. Elevated Blood Pressure- Controlled.  Continue to monitor.      4. ASCVD- The 10-year ASCVD risk score ([place] DK, et al., 2019) is: 2%.  Encourage diet, exercise, and weight loss.       Follow up in 6 months (5/2025) with echo and CTA chest prior    Total duration of face to face visit time 45 minutes.  Total time spent counseling greater than fifty percent of total visit time.  Counseling included discussion regarding imaging findings, diagnosis, possibilities, treatment options, risks and benefits.  The patient had many questions regarding the options and long-term  effects.    Dutch Hicks MD, PhD  Interventional Cardiology

## 2024-12-23 ENCOUNTER — LAB VISIT (OUTPATIENT)
Dept: LAB | Facility: HOSPITAL | Age: 49
End: 2024-12-23
Attending: INTERNAL MEDICINE
Payer: COMMERCIAL

## 2024-12-23 DIAGNOSIS — Z13.220 SCREENING FOR LIPOID DISORDERS: ICD-10-CM

## 2024-12-23 DIAGNOSIS — Z00.00 NORMAL PHYSICAL EXAM, ROUTINE: ICD-10-CM

## 2024-12-23 DIAGNOSIS — E53.8 VITAMIN B12 DEFICIENCY: ICD-10-CM

## 2024-12-23 DIAGNOSIS — Z12.5 PROSTATE CANCER SCREENING: ICD-10-CM

## 2024-12-23 LAB
ALBUMIN SERPL BCP-MCNC: 4.1 G/DL (ref 3.5–5.2)
ALP SERPL-CCNC: 80 U/L (ref 40–150)
ALT SERPL W/O P-5'-P-CCNC: 15 U/L (ref 10–44)
ANION GAP SERPL CALC-SCNC: 6 MMOL/L (ref 8–16)
AST SERPL-CCNC: 17 U/L (ref 10–40)
BASOPHILS # BLD AUTO: 0.07 K/UL (ref 0–0.2)
BASOPHILS NFR BLD: 1.1 % (ref 0–1.9)
BILIRUB SERPL-MCNC: 1 MG/DL (ref 0.1–1)
BUN SERPL-MCNC: 14 MG/DL (ref 6–20)
CALCIUM SERPL-MCNC: 9.2 MG/DL (ref 8.7–10.5)
CHLORIDE SERPL-SCNC: 106 MMOL/L (ref 95–110)
CHOLEST SERPL-MCNC: 147 MG/DL (ref 120–199)
CHOLEST/HDLC SERPL: 4.1 {RATIO} (ref 2–5)
CO2 SERPL-SCNC: 28 MMOL/L (ref 23–29)
COMPLEXED PSA SERPL-MCNC: 2.5 NG/ML (ref 0–4)
CREAT SERPL-MCNC: 0.9 MG/DL (ref 0.5–1.4)
DIFFERENTIAL METHOD BLD: NORMAL
EOSINOPHIL # BLD AUTO: 0.3 K/UL (ref 0–0.5)
EOSINOPHIL NFR BLD: 5.2 % (ref 0–8)
ERYTHROCYTE [DISTWIDTH] IN BLOOD BY AUTOMATED COUNT: 12.1 % (ref 11.5–14.5)
EST. GFR  (NO RACE VARIABLE): >60 ML/MIN/1.73 M^2
ESTIMATED AVG GLUCOSE: 97 MG/DL (ref 68–131)
GLUCOSE SERPL-MCNC: 90 MG/DL (ref 70–110)
HBA1C MFR BLD: 5 % (ref 4–5.6)
HCT VFR BLD AUTO: 44.6 % (ref 40–54)
HDLC SERPL-MCNC: 36 MG/DL (ref 40–75)
HDLC SERPL: 24.5 % (ref 20–50)
HGB BLD-MCNC: 15.1 G/DL (ref 14–18)
IMM GRANULOCYTES # BLD AUTO: 0.02 K/UL (ref 0–0.04)
IMM GRANULOCYTES NFR BLD AUTO: 0.3 % (ref 0–0.5)
LDLC SERPL CALC-MCNC: 91.8 MG/DL (ref 63–159)
LYMPHOCYTES # BLD AUTO: 2 K/UL (ref 1–4.8)
LYMPHOCYTES NFR BLD: 31.6 % (ref 18–48)
MCH RBC QN AUTO: 29.4 PG (ref 27–31)
MCHC RBC AUTO-ENTMCNC: 33.9 G/DL (ref 32–36)
MCV RBC AUTO: 87 FL (ref 82–98)
MONOCYTES # BLD AUTO: 0.5 K/UL (ref 0.3–1)
MONOCYTES NFR BLD: 8 % (ref 4–15)
NEUTROPHILS # BLD AUTO: 3.4 K/UL (ref 1.8–7.7)
NEUTROPHILS NFR BLD: 53.8 % (ref 38–73)
NONHDLC SERPL-MCNC: 111 MG/DL
NRBC BLD-RTO: 0 /100 WBC
PLATELET # BLD AUTO: 157 K/UL (ref 150–450)
PMV BLD AUTO: 10.9 FL (ref 9.2–12.9)
POTASSIUM SERPL-SCNC: 3.8 MMOL/L (ref 3.5–5.1)
PROT SERPL-MCNC: 6.8 G/DL (ref 6–8.4)
RBC # BLD AUTO: 5.14 M/UL (ref 4.6–6.2)
SODIUM SERPL-SCNC: 140 MMOL/L (ref 136–145)
TRIGL SERPL-MCNC: 96 MG/DL (ref 30–150)
TSH SERPL DL<=0.005 MIU/L-ACNC: 0.56 UIU/ML (ref 0.4–4)
VIT B12 SERPL-MCNC: 345 PG/ML (ref 210–950)
WBC # BLD AUTO: 6.36 K/UL (ref 3.9–12.7)

## 2024-12-23 PROCEDURE — 82607 VITAMIN B-12: CPT | Performed by: INTERNAL MEDICINE

## 2024-12-23 PROCEDURE — 85025 COMPLETE CBC W/AUTO DIFF WBC: CPT | Performed by: INTERNAL MEDICINE

## 2024-12-23 PROCEDURE — 83036 HEMOGLOBIN GLYCOSYLATED A1C: CPT | Performed by: INTERNAL MEDICINE

## 2024-12-23 PROCEDURE — 36415 COLL VENOUS BLD VENIPUNCTURE: CPT | Mod: PN | Performed by: INTERNAL MEDICINE

## 2024-12-23 PROCEDURE — 80061 LIPID PANEL: CPT | Performed by: INTERNAL MEDICINE

## 2024-12-23 PROCEDURE — 84153 ASSAY OF PSA TOTAL: CPT | Performed by: INTERNAL MEDICINE

## 2024-12-23 PROCEDURE — 80053 COMPREHEN METABOLIC PANEL: CPT | Performed by: INTERNAL MEDICINE

## 2024-12-23 PROCEDURE — 84443 ASSAY THYROID STIM HORMONE: CPT | Performed by: INTERNAL MEDICINE

## 2024-12-26 NOTE — PROGRESS NOTES
I sent pt a my chart message -  I reviewed your labs.  Your Ha1c was normal at 5.0.  Your Prostate specific antigen was normal at 2.5.  Your Vitamin B12 was low at 345. Are you still taking your Vitamin B12 1000 mcg daily?  If so, you are not responding to this and we may need to discuss B12 injections.  Your thyroid functions are normal.  Your Cholesterol looked good in that your LDL (bad cholesterol) was low.  Your HDL (good cholesterol) was also low.  Exercise can help to increase this level.  Your kidney function and liver functions looked good.  You are not anemic. No further recommendations at this time. Happy Holidays!    Dr. HUNT

## 2025-01-27 ENCOUNTER — PATIENT MESSAGE (OUTPATIENT)
Dept: PRIMARY CARE CLINIC | Facility: CLINIC | Age: 50
End: 2025-01-27
Payer: COMMERCIAL

## 2025-02-01 DIAGNOSIS — J00 ACUTE RHINITIS: ICD-10-CM

## 2025-02-01 NOTE — TELEPHONE ENCOUNTER
No care due was identified.  [facility] Embedded Care Due Messages. Reference number: [id number].   2/01/2025 9:04:04 AM CST

## 2025-02-02 RX ORDER — MONTELUKAST SODIUM 10 MG/1
10 TABLET ORAL NIGHTLY
Qty: 90 TABLET | Refills: 3 | Status: SHIPPED | OUTPATIENT
Start: 2025-02-02

## 2025-02-02 NOTE — TELEPHONE ENCOUNTER
Refill Decision Note   Willy Bueno  is requesting a refill authorization.  Brief Assessment and Rationale for Refill:  Approve     Medication Therapy Plan:         Comments:     Note composed:5:21 PM 02/02/2025             Appointments     Last Visit   9/9/2024 Lizzie Talavera MD   Next Visit   Visit date not found Lizzie Talavera MD

## 2025-04-02 ENCOUNTER — PATIENT MESSAGE (OUTPATIENT)
Dept: PRIMARY CARE CLINIC | Facility: CLINIC | Age: 50
End: 2025-04-02
Payer: COMMERCIAL

## 2025-04-03 ENCOUNTER — OFFICE VISIT (OUTPATIENT)
Dept: PRIMARY CARE CLINIC | Facility: CLINIC | Age: 50
End: 2025-04-03
Payer: COMMERCIAL

## 2025-04-03 VITALS
HEART RATE: 65 BPM | HEIGHT: 68 IN | RESPIRATION RATE: 18 BRPM | DIASTOLIC BLOOD PRESSURE: 82 MMHG | OXYGEN SATURATION: 97 % | TEMPERATURE: 98 F | WEIGHT: 182.75 LBS | SYSTOLIC BLOOD PRESSURE: 118 MMHG | BODY MASS INDEX: 27.7 KG/M2

## 2025-04-03 DIAGNOSIS — D17.1 LIPOMA OF TORSO: Primary | ICD-10-CM

## 2025-04-03 DIAGNOSIS — E53.8 VITAMIN B12 DEFICIENCY: ICD-10-CM

## 2025-04-03 PROCEDURE — 3074F SYST BP LT 130 MM HG: CPT | Mod: CPTII,S$GLB,, | Performed by: FAMILY MEDICINE

## 2025-04-03 PROCEDURE — 3008F BODY MASS INDEX DOCD: CPT | Mod: CPTII,S$GLB,, | Performed by: FAMILY MEDICINE

## 2025-04-03 PROCEDURE — 99214 OFFICE O/P EST MOD 30 MIN: CPT | Mod: S$GLB,,, | Performed by: FAMILY MEDICINE

## 2025-04-03 PROCEDURE — 3079F DIAST BP 80-89 MM HG: CPT | Mod: CPTII,S$GLB,, | Performed by: FAMILY MEDICINE

## 2025-04-03 PROCEDURE — 99999 PR PBB SHADOW E&M-EST. PATIENT-LVL IV: CPT | Mod: PBBFAC,,, | Performed by: FAMILY MEDICINE

## 2025-04-03 PROCEDURE — 1160F RVW MEDS BY RX/DR IN RCRD: CPT | Mod: CPTII,S$GLB,, | Performed by: FAMILY MEDICINE

## 2025-04-03 PROCEDURE — 1159F MED LIST DOCD IN RCRD: CPT | Mod: CPTII,S$GLB,, | Performed by: FAMILY MEDICINE

## 2025-04-03 RX ORDER — CYANOCOBALAMIN 1000 UG/ML
1000 INJECTION, SOLUTION INTRAMUSCULAR; SUBCUTANEOUS
Qty: 3 ML | Refills: 3 | Status: SHIPPED | OUTPATIENT
Start: 2025-04-03

## 2025-04-03 RX ORDER — SYRING-NEEDL,DISP,INSUL,0.3 ML 31 G X1/4"
SYRINGE, EMPTY DISPOSABLE MISCELLANEOUS
Qty: 10 EACH | Refills: 3 | Status: SHIPPED | OUTPATIENT
Start: 2025-04-03

## 2025-04-03 NOTE — PROGRESS NOTES
"Assessment:       1. Lipoma of torso    2. Vitamin B12 deficiency         Plan:       Lipoma of torso    Vitamin B12 deficiency  -     cyanocobalamin 1,000 mcg/mL injection; Inject 1 mL (1,000 mcg total) into the skin every 30 days.  Dispense: 3 mL; Refill: 3  -     Vitamin B12; Future; Expected date: 07/03/2025  -     insulin syringe-needle U-100 1 mL 31 gauge x 1/4" Syrg; Use with B12  Dispense: 10 each; Refill: 3      Assessment & Plan    - Identified small lump on back as likely lipoma based on physical exam findings.  - Recommend watchful waiting approach for lipoma given small size and benign characteristics.  - Considered history of lipomas and previous incidental findings on imaging studies.  - B12 levels inadequate despite oral supplementation and high dietary intake.  - Injectable B12 supplementation appropriate given persistently low levels on oral therapy.      BENIGN LIPOMATOUS NEOPLASM OF SKIN AND SUBCUTANEOUS TISSUE OF TRUNK:   Instructed patient to monitor for changes in size, pain, erythema, or other concerning symptoms.   Recommend watchful waiting and to contact the office if any changes occur.   Suggested ultrasound if the lipoma grows or becomes bothersome, with potential referral to a surgeon if significant changes are observed.    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   Acknowledged patient's sensitivity to new lumps given this history.    FAMILY HISTORY OF CONGENITAL HEART MALFORMATIONS:   Documented patient's family history of bicuspid aortic valve.    VITAMIN B12 DEFICIENCY MANAGEMENT:   Initiated injectable cyanocobalamin (B12) 1 cc monthly, discontinuing oral B12 supplementation.   Ordered 90-day supply of 1 cc B12 vials with refills and insulin syringes for injections.   Scheduled B12 level check in 3-4 months, " "timed just before next scheduled injection    Medication List with Changes/Refills   New Medications    CYANOCOBALAMIN 1,000 MCG/ML INJECTION    Inject 1 mL (1,000 mcg total) into the skin every 30 days.    INSULIN SYRINGE-NEEDLE U-100 1 ML 31 GAUGE X 1/4" SYRG    Use with B12   Current Medications    CALCIUM CARBONATE/VITAMIN D3 (VITAMIN D-3 ORAL)    Take 1 capsule by mouth every Mon, Wed, Fri.    FAMOTIDINE (PEPCID) 20 MG TABLET    Take 20 mg by mouth daily as needed for Heartburn.    MONTELUKAST (SINGULAIR) 10 MG TABLET    TAKE 1 TABLET(10 MG) BY MOUTH EVERY EVENING    MULTIVITAMIN (THERAGRAN) PER TABLET    Take 1 tablet by mouth once daily.    TRIAMCINOLONE ACETONIDE 0.1% (KENALOG) 0.1 % OINTMENT    Apply topically. Uses prn. Unsure of dosage   Discontinued Medications    ALBUTEROL (PROVENTIL/VENTOLIN HFA) 90 MCG/ACTUATION INHALER    Inhale 2 puffs into the lungs every 6 (six) hours as needed for Wheezing or Shortness of Breath. Rescue    CYANOCOBALAMIN, VITAMIN B-12, (VITAMIN B-12 ORAL)    Take 5,000 mcg by mouth once daily.    MINOXIDIL (LONITEN) 2.5 MG TABLET    Take 2.5 mg by mouth once daily.    ONDANSETRON (ZOFRAN-ODT) 4 MG TBDL    Take 4 mg by mouth every 4 (four) hours as needed.         Subjective:    Patient ID: Willy Bueon is a 49 y.o. male.  Chief Complaint: Mass (Right side (ribs))    HPI  History of Present Illness    CHIEF COMPLAINT:  Patient presents today for evaluation of a newly discovered lump on back    BACK MASS:  He reports a lump on his right flank at the axillary line. He denies associated pain, overlying skin changes, sensitivity, redness, or warmth in the area.    HISTORY OF LIPOMAS:  He has a history of lipomas, with previous removal from the back of his head. There is some discrepancy in the medical record regarding the presence of a cardiac lipoma.    MEDICAL HISTORY:  He experienced shortness of breath two years ago. Imaging studies revealed incidental findings including " "four thyroid tumors and two liver tumors.    FAMILY HISTORY:  He has a family history of bicuspid valve.    MEDICATIONS AND DIET:  He has been taking oral Vitamin B12 5000mcg every other day for approximately one year without any deficiency symptoms. His diet consists primarily of red meat and fish.      ROS:  Constitutional: -fevers, -chills, -weight loss, -night sweats  Respiratory: -shortness of breath  Integumentary: -skin color changes, +lumps/masses       Review of Systems    Objective:      Vitals:    04/03/25 0855   BP: 118/82   BP Location: Left arm   Patient Position: Sitting   Pulse: 65   Resp: 18   Temp: 97.7 °F (36.5 °C)   TempSrc: Temporal   SpO2: 97%   Weight: 82.9 kg (182 lb 12.2 oz)   Height: 5' 8" (1.727 m)     BP Readings from Last 5 Encounters:   04/03/25 118/82   11/07/24 121/84   09/09/24 110/82   05/14/24 108/63   05/01/24 127/85     Wt Readings from Last 5 Encounters:   04/03/25 82.9 kg (182 lb 12.2 oz)   11/07/24 79.7 kg (175 lb 11.3 oz)   09/09/24 81.6 kg (179 lb 14.3 oz)   05/14/24 82.6 kg (182 lb)   05/01/24 82.6 kg (182 lb 1.6 oz)     Physical Exam  Physical Exam    General: Well-developed. Well-nourished. No acute distress.  Neurological: Alert & oriented x3. No slurred speech. Normal gait.  Psychiatric: Normal mood. Normal affect. Good insight. Good judgment.  Skin: Warm. Dry. No rash. 1 cm mobile subcutaneous nontender mass on right flank c/w lipoma. No overlying skin changes.         Lab Results   Component Value Date    WBC 6.36 12/23/2024    HGB 15.1 12/23/2024    HCT 44.6 12/23/2024     12/23/2024    CHOL 147 12/23/2024    TRIG 96 12/23/2024    HDL 36 (L) 12/23/2024    ALT 15 12/23/2024    AST 17 12/23/2024     12/23/2024    K 3.8 12/23/2024     12/23/2024    CREATININE 0.9 12/23/2024    BUN 14 12/23/2024    CO2 28 12/23/2024    TSH 0.561 12/23/2024    PSA 2.5 12/23/2024    HGBA1C 5.0 12/23/2024      This note was generated with the assistance of ambient " listening technology. Verbal consent was obtained by the patient and accompanying visitor(s) for the recording of patient appointment to facilitate this note. I attest to having reviewed and edited the generated note for accuracy, though some syntax or spelling errors may persist. Please contact the author of this note for any clarification.

## 2025-05-21 ENCOUNTER — PATIENT MESSAGE (OUTPATIENT)
Dept: PRIMARY CARE CLINIC | Facility: CLINIC | Age: 50
End: 2025-05-21
Payer: COMMERCIAL

## 2025-05-23 ENCOUNTER — PATIENT MESSAGE (OUTPATIENT)
Dept: CARDIOLOGY | Facility: CLINIC | Age: 50
End: 2025-05-23
Payer: COMMERCIAL

## 2025-06-12 ENCOUNTER — HOSPITAL ENCOUNTER (OUTPATIENT)
Dept: RADIOLOGY | Facility: HOSPITAL | Age: 50
Discharge: HOME | End: 2025-06-12
Attending: INTERNAL MEDICINE
Payer: COMMERCIAL

## 2025-06-12 ENCOUNTER — RESULTS FOLLOW-UP (OUTPATIENT)
Dept: PRIMARY CARE CLINIC | Facility: CLINIC | Age: 50
End: 2025-06-12

## 2025-06-12 DIAGNOSIS — E04.2 MULTINODULAR GOITER: ICD-10-CM

## 2025-06-12 PROCEDURE — 76536 US EXAM OF HEAD AND NECK: CPT | Mod: TC

## 2025-06-12 PROCEDURE — 76536 US EXAM OF HEAD AND NECK: CPT | Mod: 26,,, | Performed by: RADIOLOGY

## 2025-06-18 ENCOUNTER — HOSPITAL ENCOUNTER (OUTPATIENT)
Dept: RADIOLOGY | Facility: HOSPITAL | Age: 50
Discharge: HOME | End: 2025-06-18
Attending: INTERNAL MEDICINE
Payer: COMMERCIAL

## 2025-06-18 ENCOUNTER — HOSPITAL ENCOUNTER (OUTPATIENT)
Dept: CARDIOLOGY | Facility: HOSPITAL | Age: 50
Discharge: HOME | End: 2025-06-18
Attending: INTERNAL MEDICINE
Payer: COMMERCIAL

## 2025-06-18 VITALS
HEIGHT: 68 IN | BODY MASS INDEX: 27.7 KG/M2 | HEART RATE: 71 BPM | SYSTOLIC BLOOD PRESSURE: 141 MMHG | DIASTOLIC BLOOD PRESSURE: 87 MMHG | WEIGHT: 182.75 LBS

## 2025-06-18 DIAGNOSIS — R93.1 ABNORMAL ECHOCARDIOGRAM: ICD-10-CM

## 2025-06-18 DIAGNOSIS — I77.89 AORTIC ROOT ENLARGEMENT: ICD-10-CM

## 2025-06-18 LAB
AORTIC SIZE INDEX (SOV): 2 CM/M2
AORTIC SIZE INDEX: 1.6 CM/M2
ASCENDING AORTA: 3.2 CM
AV AREA BY CONTINUOUS VTI: 3.5 CM2
AV INDEX (PROSTH): 0.87
AV LVOT MEAN GRADIENT: 3 MMHG
AV LVOT PEAK GRADIENT: 6 MMHG
AV MEAN GRADIENT: 4 MMHG
AV PEAK GRADIENT: 8 MMHG
AV VALVE AREA BY VELOCITY RATIO: 3.6 CM²
AV VALVE AREA: 3.6 CM2
AV VELOCITY RATIO: 0.86
BSA FOR ECHO PROCEDURE: 1.99 M2
CV ECHO LV RWT: 0.21 CM
DOP CALC AO PEAK VEL: 1.4 M/S
DOP CALC AO VTI: 26.8 CM
DOP CALC LVOT AREA: 4.2 CM2
DOP CALC LVOT DIAMETER: 2.3 CM
DOP CALC LVOT PEAK VEL: 1.2 M/S
DOP CALC LVOT STROKE VOLUME: 96.8 CM3
DOP CALCLVOT PEAK VEL VTI: 23.3 CM
E WAVE DECELERATION TIME: 172 MS
E/A RATIO: 1.35
E/E' RATIO: 5 M/S
ECHO EF ESTIMATED: 67 %
ECHO LV POSTERIOR WALL: 0.5 CM (ref 0.6–1.1)
EJECTION FRACTION: 65 %
FRACTIONAL SHORTENING: 37.5 % (ref 28–44)
INTERVENTRICULAR SEPTUM: 0.7 CM (ref 0.6–1.1)
IVC DIAMETER: 1.46 CM
LA MAJOR: 6.1 CM
LA MINOR: 5.9 CM
LA WIDTH: 3.8 CM
LEFT ATRIUM SIZE: 3.4 CM
LEFT ATRIUM VOLUME INDEX MOD: 33 ML/M2
LEFT ATRIUM VOLUME INDEX: 33 ML/M2
LEFT ATRIUM VOLUME MOD: 65 ML
LEFT ATRIUM VOLUME: 66 CM3
LEFT INTERNAL DIMENSION IN SYSTOLE: 3 CM (ref 2.1–4)
LEFT VENTRICLE DIASTOLIC VOLUME INDEX: 53.81 ML/M2
LEFT VENTRICLE DIASTOLIC VOLUME: 106 ML
LEFT VENTRICLE MASS INDEX: 44.8 G/M2
LEFT VENTRICLE SYSTOLIC VOLUME INDEX: 18.3 ML/M2
LEFT VENTRICLE SYSTOLIC VOLUME: 36 ML
LEFT VENTRICULAR INTERNAL DIMENSION IN DIASTOLE: 4.8 CM (ref 3.5–6)
LEFT VENTRICULAR MASS: 88.3 G
LV LATERAL E/E' RATIO: 3.9
LV SEPTAL E/E' RATIO: 7.3
MV PEAK A VEL: 0.49 M/S
MV PEAK E VEL: 0.66 M/S
OHS CV RV/LV RATIO: 0.83 CM
PISA TR MAX VEL: 2.1 M/S
RA MAJOR: 5.21 CM
RA PRESSURE ESTIMATED: 3 MMHG
RA WIDTH: 3.95 CM
RIGHT ATRIAL AREA: 16 CM2
RIGHT VENTRICLE DIASTOLIC BASEL DIMENSION: 4 CM
RV TB RVSP: 5 MMHG
RV TISSUE DOPPLER FREE WALL SYSTOLIC VELOCITY 1 (APICAL 4 CHAMBER VIEW): 11.44 CM/S
SINUS: 3.95 CM
STJ: 3.3 CM
TDI LATERAL: 0.17 M/S
TDI SEPTAL: 0.09 M/S
TDI: 0.13 M/S
TRICUSPID ANNULAR PLANE SYSTOLIC EXCURSION: 1.8 CM
TV PEAK GRADIENT: 18 MMHG
TV REST PULMONARY ARTERY PRESSURE: 21 MMHG
Z-SCORE OF LEFT VENTRICULAR DIMENSION IN END DIASTOLE: -1.65
Z-SCORE OF LEFT VENTRICULAR DIMENSION IN END SYSTOLE: -1.17

## 2025-06-18 PROCEDURE — 71275 CT ANGIOGRAPHY CHEST: CPT | Mod: 26,,, | Performed by: RADIOLOGY

## 2025-06-18 PROCEDURE — 74174 CTA ABD&PLVS W/CONTRAST: CPT | Mod: TC

## 2025-06-18 PROCEDURE — 25500020 PHARM REV CODE 255: Performed by: INTERNAL MEDICINE

## 2025-06-18 PROCEDURE — 93306 TTE W/DOPPLER COMPLETE: CPT

## 2025-06-18 PROCEDURE — 93306 TTE W/DOPPLER COMPLETE: CPT | Mod: 26,,, | Performed by: INTERNAL MEDICINE

## 2025-06-18 PROCEDURE — 74174 CTA ABD&PLVS W/CONTRAST: CPT | Mod: 26,,, | Performed by: RADIOLOGY

## 2025-06-18 RX ADMIN — IOHEXOL 75 ML: 350 INJECTION, SOLUTION INTRAVENOUS at 04:06

## 2025-07-01 ENCOUNTER — PATIENT MESSAGE (OUTPATIENT)
Dept: CARDIOLOGY | Facility: CLINIC | Age: 50
End: 2025-07-01
Payer: COMMERCIAL

## 2025-07-03 ENCOUNTER — OFFICE VISIT (OUTPATIENT)
Dept: CARDIOLOGY | Facility: CLINIC | Age: 50
End: 2025-07-03
Payer: COMMERCIAL

## 2025-07-03 VITALS
DIASTOLIC BLOOD PRESSURE: 82 MMHG | HEIGHT: 68 IN | SYSTOLIC BLOOD PRESSURE: 116 MMHG | HEART RATE: 64 BPM | BODY MASS INDEX: 26.22 KG/M2 | WEIGHT: 173 LBS

## 2025-07-03 DIAGNOSIS — I77.89 AORTIC ROOT ENLARGEMENT: ICD-10-CM

## 2025-07-03 DIAGNOSIS — R93.1 ABNORMAL ECHOCARDIOGRAM: Primary | ICD-10-CM

## 2025-07-03 RX ORDER — FINASTERIDE 1 MG/1
1 TABLET, FILM COATED ORAL DAILY
COMMUNITY
Start: 2025-03-29

## 2025-07-03 NOTE — PATIENT INSTRUCTIONS
Assessment/Plan:  Willy Bueno is a 49 y.o. male with overweight, former smoker, who presents for a follow up appointment.    1. Enlarged Ascending Aorta- Stable. CTA Chest/Abd/Pelvis on 6/18/2025 revealed aortic root is upper normal to mildly dilated measuring 3.6 cm. Otherwise, aorta is normal in caliber without evidence of dissection, aneurysm or significant atherosclerotic disease. Multinodular thyroid gland with substernal extension better evaluated on recent thyroid ultrasound. Echo on 6/18/2025 with EF 65%; normal diastolic function; right ventricle is borderline -mildly dilated systolic function is normal; right atrium is mildly dilated; mild TR;  estimated pulmonary artery systolic pressure is 21 mmHg; normal venous pressure at 3 mmHg. Repeat imaging in 2 years.     2. Overweight- Encourage diet, exercise, and weight loss.     3. Elevated Blood Pressure- Controlled.  Continue to monitor.      4. ASCVD- The 10-year ASCVD risk score (Bogard DK, et al., 2019) is: 2%.  Encourage diet, exercise, and weight loss.       Follow up in 1 year

## 2025-07-03 NOTE — PROGRESS NOTES
[facility]      Chief Complaint   Patient presents with    Aortic root enlargement       Patient ID: Willy Bueno is a 49 y.o. male with overweight, former smoker, who presents for a follow up  appointment.  Pertinent history/events are as follows:     -Pt kindly referred by Dr. Talavera for evaluation of enlarged aorta.     - At clinic visit on  5/25/2023 Mr. Bueno is accompanied by his wife.  He reports no chest pain or SOB.  He reports family history of bicuspid aortic valve in his father.  Formerly smoked a pack a day for 22 years.  Quit in 2018.  Echo on 6/19/2023 demonstrated EF 65%; normal left ventricular diastolic function; normal right ventricular size with normal right ventricular systolic function; mild tricuspid regurgitation; intermediate central venous pressure (8 mmHg); estimated PA systolic pressure is 30 mmHg; the sinuses of Valsalva is mildly dilated; there appears to be lipomatous hypertrophy of the interatrial septum.  CTA Chest on 5/23/2023 is unremarkable.    Plan:  Enlarged Ascending Aorta- Echo shows mild enlargement of ascending aorta.  There appears to be lipomatous hypertrophy of the interatrial septum.  No aortic insufficiency.  CTA chest is unremarkable.  Given family history of bicuspid aortic valve in his father, will check CTA chest/abd/pelvis to rule out aortopathy.  Repeat echo in 1 year.    Overweight- Encourage diet, exercise, and weight loss.     -At follow up clinic visit on 8/9/2023, Mr. Bueno reported no chest pain, palpitations, light headedness or SOB. H reports blood pressure is 135/85 at home most of the time. Echo on 5/19/2023 revealed normal systolic function with EF of 65%, Normal left ventricular diastolic function. PA systolic pressure is 30 mmHg. Sinuses of Valsalva is mildly dilated. CTA Chest/Abdomen/Pelvis on 5/31/2023 revealed 3 vessel arch.  The proximal arch vessels are patent.  The thoracic aorta tapers normally.  The celiac axis,  SMA, bilateral renal arteries, bilateral accessory renal arteries, DELLA, and distal aorto iliac branches all are patent.  No findings to suggest aneurysm or dissection. No pulmonary thromboembolism. Right hepatic lobe lesion suggests hemangioma, smaller lesion within the hepatic dome may reflect flash filling hemangioma although incompletely characterized.  Plan:   Enlarged Ascending Aorta- Echo shows mild enlargement of ascending aorta.  There appears to be lipomatous hypertrophy of the interatrial septum.  No aortic insufficiency.  CTA chest is unremarkable.  Given family history of bicuspid aortic valve in his father. Echo on 5/19/2023 revealed normal systolic function with EF of 65%, Normal left ventricular diastolic function. PA systolic pressure is 30 mmHg. Sinuses of Valsalva is mildly dilated. CTA Chest/Abdomen/Pelvis on 5/31/2023 revealed 3 vessel arch. The proximal arch vessels are patent.  The thoracic aorta tapers normally.  The celiac axis, SMA, bilateral renal arteries, bilateral accessory renal arteries, DELLA, and distal aorto iliac branches all are patent.  No findings to suggest aneurysm or dissection. No pulmonary thromboembolism. Right hepatic lobe lesion suggests hemangioma, smaller lesion within the hepatic dome may reflect flash filling hemangioma although incompletely characterized.  Repeat echo in 1 year.    Overweight- Encourage diet, exercise, and weight loss.   High blood pressure: Blood pressure in 135/85 at home. Pt to limit sodium 2000 mg/day. Patient to keep a log of blood pressure and heart rate at home and bring in next clinic visit. Report to the clinic if blood pressure greater than 140 or less than 100 consistently or experience light headedness/syncope related symptoms. LDL 92 on 8/24/22.    5/1/2024 clinic visit: Mr. Bueno reports doing well with no chest pain, palpitations, light headedness or SOB.   Plan:  Enlarged Ascending Aorta- Echo shows mild enlargement of ascending  aorta.  There appears to be lipomatous hypertrophy of the interatrial septum.  No aortic insufficiency.  CTA chest is unremarkable.  Given family history of bicuspid aortic valve in his father. Echo on 5/19/2023 revealed normal systolic function with EF of 65%, Normal left ventricular diastolic function. PA systolic pressure is 30 mmHg. Sinuses of Valsalva is mildly dilated. CTA Chest/Abdomen/Pelvis on 5/31/2023 revealed 3 vessel arch. The proximal arch vessels are patent.  The thoracic aorta tapers normally.  The celiac axis, SMA, bilateral renal arteries, bilateral accessory renal arteries, DELLA, and distal aorto iliac branches all are patent.  No findings to suggest aneurysm or dissection. No pulmonary thromboembolism. Right hepatic lobe lesion suggests hemangioma, smaller lesion within the hepatic dome may reflect flash filling hemangioma although incompletely characterized.  Repeat echo in 1 year (5/2024).    Overweight- Encourage diet, exercise, and weight loss.   Elevated Blood Pressure- Controlled.  Continue to monitor.    ASCVD- The 10-year ASCVD risk score (Marcell DK, et al., 2019) is: 2%.  Encourage diet, exercise, and weight loss.     11/7/2024 clinic visit: Mr. Bueno reports no new issues.  He has no chest pain, palpitations, light headedness or SOB. Cardiac MRI on 8/15/57341 revealed the aortic root measures upper normal to mildly dilated in dimension; 36 x 36 x 39 mm (z score: 1.8 x 1.8 x 2.5). The ascending aorta measures upper normal to mildly dilated in dimension; 32 x 33 mm (z score: 1.9 x 2.2)  Plan:  Enlarged Ascending Aorta- Echo shows mild enlargement of ascending aorta.  There appears to be lipomatous hypertrophy of the interatrial septum.  No aortic insufficiency.  CTA chest is unremarkable.  CTA Chest/Abdomen/Pelvis on 5/31/2023 revealed 3 vessel arch. The proximal arch vessels are patent.  The thoracic aorta tapers normally.  The celiac axis, SMA, bilateral renal arteries, bilateral  accessory renal arteries, DELLA, and distal aorto iliac branches all are patent.  No findings to suggest aneurysm or dissection. No pulmonary thromboembolism. Right hepatic lobe lesion suggests hemangioma, smaller lesion within the hepatic dome may reflect flash filling hemangioma although incompletely characterized.  Cardiac MRI on 8/15/92276 revealed the aortic root measures upper normal to mildly dilated in dimension; 36 x 36 x 39 mm (z score: 1.8 x 1.8 x 2.5). The ascending aorta measures upper normal to mildly dilated in dimension; 32 x 33 mm (z score: 1.9 x 2.2).     Overweight- Encourage diet, exercise, and weight loss.   Elevated Blood Pressure- Controlled.  Continue to monitor.    ASCVD- The 10-year ASCVD risk score (Marcell DK, et al., 2019) is: 2%.  Encourage diet, exercise, and weight loss.     HPI:  Mr. Bueno reports doing well. He has no chest pain, palpitations, light headedness or SOB. States his family is recovering from COVID infection. CTA Chest/Abd/Pelvis on 6/18/2025 revealed aortic root is upper normal to mildly dilated measuring 3.6 cm. Otherwise, aorta is normal in caliber without evidence of dissection, aneurysm or significant atherosclerotic disease. Multinodular thyroid gland with substernal extension better evaluated on recent thyroid ultrasound. Echo on 6/18/2025 with EF 65%; normal diastolic function; right ventricle is borderline -mildly dilated systolic function is normal; right atrium is mildly dilated; mild TR;  estimated pulmonary artery systolic pressure is 21 mmHg; normal venous pressure at 3 mmHg.    Past Medical History:   Diagnosis Date    COVID-19     9/2021; 7/25/22    Dislocated shoulder, left, sequela     x 2     Dislocated shoulder, right, sequela     Jaw fracture     Leg fracture     in childhood     Psoriasis     Right foot pain 8/24/2022    Supraspinatus tendon tear     left    Wrist fracture, closed      Past Surgical History:   Procedure Laterality Date    LIPOMA  RESECTION  1996    MOLE REMOVAL       Social History     Socioeconomic History    Marital status:    Tobacco Use    Smoking status: Former     Current packs/day: 0.00     Average packs/day: 1 pack/day for 22.0 years (22.0 ttl pk-yrs)     Types: Cigarettes     Start date:      Quit date: 2018     Years since quittin.5    Smokeless tobacco: Never   Substance and Sexual Activity    Alcohol use: Yes     Alcohol/week: 1.0 standard drink of alcohol     Types: 1 Cans of beer per week     Comment: rare    Drug use: Never    Sexual activity: Yes     Partners: Female     Birth control/protection: None     Social Drivers of Health     Financial Resource Strain: Medium Risk (4/3/2025)    Overall Financial Resource Strain (CARDIA)     Difficulty of Paying Living Expenses: Somewhat hard   Food Insecurity: No Food Insecurity (4/3/2025)    Hunger Vital Sign     Worried About Running Out of Food in the Last Year: Never true     Ran Out of Food in the Last Year: Never true   Transportation Needs: No Transportation Needs (4/3/2025)    PRAPARE - Transportation     Lack of Transportation (Medical): No     Lack of Transportation (Non-Medical): No   Physical Activity: Insufficiently Active (4/3/2025)    Exercise Vital Sign     Days of Exercise per Week: 2 days     Minutes of Exercise per Session: 10 min   Stress: Stress Concern Present (4/3/2025)    [employer] [place] [employer] - Occupational Stress Questionnaire     Feeling of Stress : To some extent   Housing Stability: High Risk (4/3/2025)    Housing Stability Vital Sign     Unable to Pay for Housing in the Last Year: Yes     Number of Times Moved in the Last Year: 0     Homeless in the Last Year: No     Family History   Adopted: Yes   Problem Relation Name Age of Onset    Congenital heart disease Father Chris Jackson         bicuspicd aortic valve    Heart disease Father Chris Jackosn     Alzheimer's disease Maternal Grandmother      Alzheimer's disease  "Maternal Aunt  53    Depression Mother Mitali Mccloud     Early death Maternal Aunt Unknown        Review of patient's allergies indicates:   Allergen Reactions    Bactrim [sulfamethoxazole-trimethoprim] Blisters    Doxycycline Nausea Only       Medication List with Changes/Refills   Current Medications    CYANOCOBALAMIN 1,000 MCG/ML INJECTION    Inject 1 mL (1,000 mcg total) into the skin every 30 days.    FAMOTIDINE (PEPCID) 20 MG TABLET    Take 20 mg by mouth daily as needed for Heartburn.    FINASTERIDE (PROPECIA) 1 MG TABLET    Take 1 mg by mouth once daily.    INSULIN SYRINGE-NEEDLE U-100 1 ML 31 GAUGE X 1/4" SYRG    Use with B12    MONTELUKAST (SINGULAIR) 10 MG TABLET    TAKE 1 TABLET(10 MG) BY MOUTH EVERY EVENING    MULTIVITAMIN (THERAGRAN) PER TABLET    Take 1 tablet by mouth once daily.    TRIAMCINOLONE ACETONIDE 0.1% (KENALOG) 0.1 % OINTMENT    Apply topically. Uses prn. Unsure of dosage   Discontinued Medications    CALCIUM CARBONATE/VITAMIN D3 (VITAMIN D-3 ORAL)    Take 1 capsule by mouth every Mon, Wed, Fri.       Review of Systems  Constitution: Denies chills, fever, and sweats.  HENT: Denies headaches or blurry vision.  Cardiovascular: Denies chest pain or irregular heart beat.  Respiratory: Denies cough or shortness of breath.  Gastrointestinal: Denies abdominal pain, nausea, or vomiting.  Musculoskeletal: Denies muscle cramps.  Neurological: Denies dizziness or focal weakness.  Psychiatric/Behavioral: Normal mental status.  Hematologic/Lymphatic: Denies bleeding problem or easy bruising/bleeding.  Skin: Denies rash or suspicious lesions    Physical Examination  /82   Pulse 64   Ht 5' 8" (1.727 m)   Wt 78.5 kg (173 lb)   BMI 26.30 kg/m²     Constitutional: No acute distress, conversant  HEENT: Sclera anicteric, Pupils equal, round and reactive to light, extraocular motions intact, Oropharynx clear  Neck: No JVD, no carotid bruits  Cardiovascular: regular rate and rhythm, no murmur, rubs or " "gallops, normal S1/S2  Pulmonary: Clear to auscultation bilaterally  Abdominal: Abdomen soft, nontender, nondistended, positive bowel sounds  Extremities: No lower extremity edema,   Pulses:  Carotid pulses are 2+ on the right side, and 2+ on the left side.  Radial pulses are 2+ on the right side, and 2+ on the left side.   Femoral pulses are 2+ on the right side, and 2+ on the left side.  Popliteal pulses are 2+ on the right side, and 2+ on the left side.   Dorsalis pedis pulses are 2+ on the right side, and 2+ on the left side.   Posterior tibial pulses are 2+ on the right side, and 2+ on the left side.    Skin: No ecchymosis, erythema, or ulcers  Psych: Alert and oriented x 3, appropriate affect  Neuro: CNII-XII intact, no focal deficits    Labs:  Most Recent Data  CBC:   Lab Results   Component Value Date    WBC 6.36 12/23/2024    HGB 15.1 12/23/2024    HCT 44.6 12/23/2024     12/23/2024    MCV 87 12/23/2024    RDW 12.1 12/23/2024     BMP:   Lab Results   Component Value Date     12/23/2024    K 3.8 12/23/2024     12/23/2024    CO2 28 12/23/2024    BUN 14 12/23/2024    CREATININE 0.9 12/23/2024    GLU 90 12/23/2024    CALCIUM 9.2 12/23/2024    MG 2.2 08/24/2022     LFTS;   Lab Results   Component Value Date    PROT 6.8 12/23/2024    ALBUMIN 4.1 12/23/2024    BILITOT 1.0 12/23/2024    AST 17 12/23/2024    ALKPHOS 80 12/23/2024    ALT 15 12/23/2024     COAGS: No results found for: "INR", "PROTIME", "PTT"  FLP:   Lab Results   Component Value Date    CHOL 147 12/23/2024    HDL 36 (L) 12/23/2024    LDLCALC 91.8 12/23/2024    TRIG 96 12/23/2024    CHOLHDL 24.5 12/23/2024     CARDIAC: No results found for: "TROPONINI", "CKTOTAL", "CKMB", "BNP"    Imaging:    CTA Chest/Abd/Pelvis 6/18/2025:  Aortic root is upper normal to mildly dilated measuring 3.6 cm. Otherwise, aorta is normal in caliber without evidence of dissection, aneurysm or significant atherosclerotic disease.   Multinodular thyroid gland " with substernal extension better evaluated on recent thyroid ultrasound.   Large right hepatic lobe lesion likely a hemangioma similar to prior.   Mild splenomegaly.    Echo 6/18/2025:    No significant abnormalities by 2D, M-mode or CFD.    Left Ventricle: The left ventricle is normal in size. Ventricular mass is normal. Normal wall thickness. There is normal systolic function with a visually estimated ejection fraction of 60 - 65%. Ejection fraction is approximately 65%. There is normal diastolic function.    Right Ventricle: The right ventricle is borderline -mildly dilated Systolic function is normal.    Right Atrium: The right atrium is mildly dilated .    Tricuspid Valve: There is mild regurgitation.    Pulmonary Artery: No pulmonary hypertension. The estimated pulmonary artery systolic pressure is 21 mmHg.    IVC/SVC: Normal venous pressure at 3 mmHg.    Cardiac MRI 8/15/87381:  Impression:   The left ventricular volumes are normal. There is no evidence of LV wall motion anomalies. The calculated LVEF is 55 %.   The right ventricle volumes are normal. The calculated RVEF is 54 %.  The aortic valve is structurally normal and trileaflet. No significant aortic valve regurgitation  The aortic root measures upper normal to mildly dilated in dimension; 36 x 36 x 39 mm (z score: 1.8 x 1.8 x 2.5).  The ascending aorta measures upper normal to mildly dilated in dimension; 32 x 33 mm (z score: 1.9 x 2.2)  The ST junction is normal in caliber. No coarctation of the aorta.  No prior cardiac MRI for comparison.    Echo 5/19/2023  Normal systolic function.  The estimated ejection fraction is 65%.  Normal left ventricular diastolic function.  Normal right ventricular size with normal right ventricular systolic function.  Mild tricuspid regurgitation.  Intermediate central venous pressure (8 mmHg).  The estimated PA systolic pressure is 30 mmHg.  The sinuses of Valsalva is mildly dilated.    CTA Chest/Abdomen/Pelvis  5/31/2023  There is a 3 vessel arch.  The proximal arch vessels are patent.  The thoracic aorta tapers normally.  The celiac axis, SMA, bilateral renal arteries, bilateral accessory renal arteries, DELLA, and distal aorto iliac branches all are patent.  No findings to suggest aneurysm or dissection.   No pulmonary thromboembolism.   Right hepatic lobe lesion suggests hemangioma, smaller lesion within the hepatic dome may reflect flash filling hemangioma although incompletely characterized.    EKG 8/24/2022:  Sinus bradycardia    CTA Chest 5/23/2023:  Heart size within normal limits.    No pericardial or pleural effusion.    Thoracic aorta normal caliber.    Mildly enlarged left thyroid lobe with substernal extension.  No lymphadenopathy.    Echo 6/19/2023:  Normal systolic function.  The estimated ejection fraction is 65%.  Normal left ventricular diastolic function.  Normal right ventricular size with normal right ventricular systolic function.  Mild tricuspid regurgitation.  Intermediate central venous pressure (8 mmHg).  The estimated PA systolic pressure is 30 mmHg.  The sinuses of Valsalva is mildly dilated.    Assessment/Plan:  Willy Bueno is a 49 y.o. male with overweight, former smoker, who presents for a follow up appointment.    1. Enlarged Ascending Aorta- Stable. CTA Chest/Abd/Pelvis on 6/18/2025 revealed aortic root is upper normal to mildly dilated measuring 3.6 cm. Otherwise, aorta is normal in caliber without evidence of dissection, aneurysm or significant atherosclerotic disease. Multinodular thyroid gland with substernal extension better evaluated on recent thyroid ultrasound. Echo on 6/18/2025 with EF 65%; normal diastolic function; right ventricle is borderline -mildly dilated systolic function is normal; right atrium is mildly dilated; mild TR;  estimated pulmonary artery systolic pressure is 21 mmHg; normal venous pressure at 3 mmHg. Repeat imaging in 2 years.     2. Overweight- Encourage  diet, exercise, and weight loss.     3. Elevated Blood Pressure- Controlled.  Continue to monitor.      4. ASCVD- The 10-year ASCVD risk score (Marcell GOULD, et al., 2019) is: 2%.  Encourage diet, exercise, and weight loss.       Follow up in 1 year    Total duration of face to face visit time 15 minutes.  Total time spent counseling greater than fifty percent of total visit time.  Counseling included discussion regarding imaging findings, diagnosis, possibilities, treatment options, risks and benefits.  The patient had many questions regarding the options and long-term effects.    Dutch Hicks MD, PhD  Interventional Cardiology